# Patient Record
Sex: MALE | Race: WHITE | NOT HISPANIC OR LATINO
[De-identification: names, ages, dates, MRNs, and addresses within clinical notes are randomized per-mention and may not be internally consistent; named-entity substitution may affect disease eponyms.]

---

## 2017-07-27 ENCOUNTER — TRANSCRIPTION ENCOUNTER (OUTPATIENT)
Age: 57
End: 2017-07-27

## 2017-11-02 ENCOUNTER — APPOINTMENT (OUTPATIENT)
Dept: INTERNAL MEDICINE | Facility: CLINIC | Age: 57
End: 2017-11-02

## 2017-12-11 ENCOUNTER — NON-APPOINTMENT (OUTPATIENT)
Age: 57
End: 2017-12-11

## 2017-12-11 ENCOUNTER — APPOINTMENT (OUTPATIENT)
Dept: INTERNAL MEDICINE | Facility: CLINIC | Age: 57
End: 2017-12-11
Payer: COMMERCIAL

## 2017-12-11 VITALS
DIASTOLIC BLOOD PRESSURE: 77 MMHG | HEIGHT: 69 IN | BODY MASS INDEX: 25.62 KG/M2 | RESPIRATION RATE: 18 BRPM | SYSTOLIC BLOOD PRESSURE: 135 MMHG | WEIGHT: 173 LBS | TEMPERATURE: 98.4 F | HEART RATE: 69 BPM | OXYGEN SATURATION: 98 %

## 2017-12-11 DIAGNOSIS — Z92.29 PERSONAL HISTORY OF OTHER DRUG THERAPY: ICD-10-CM

## 2017-12-11 DIAGNOSIS — S83.511A SPRAIN OF ANTERIOR CRUCIATE LIGAMENT OF RIGHT KNEE, INITIAL ENCOUNTER: ICD-10-CM

## 2017-12-11 DIAGNOSIS — Z87.39 PERSONAL HISTORY OF OTHER DISEASES OF THE MUSCULOSKELETAL SYSTEM AND CONNECTIVE TISSUE: ICD-10-CM

## 2017-12-11 LAB
DATE COLLECTED: NORMAL
HEMOCCULT SP1 STL QL: NEGATIVE

## 2017-12-11 PROCEDURE — 82270 OCCULT BLOOD FECES: CPT

## 2017-12-11 PROCEDURE — 99396 PREV VISIT EST AGE 40-64: CPT

## 2017-12-11 PROCEDURE — 93000 ELECTROCARDIOGRAM COMPLETE: CPT

## 2017-12-12 LAB
25(OH)D3 SERPL-MCNC: 24.2 NG/ML
ALBUMIN SERPL ELPH-MCNC: 4.7 G/DL
ALP BLD-CCNC: 52 U/L
ALT SERPL-CCNC: 32 U/L
ANION GAP SERPL CALC-SCNC: 14 MMOL/L
APPEARANCE: CLEAR
AST SERPL-CCNC: 26 U/L
BASOPHILS # BLD AUTO: 0.01 K/UL
BASOPHILS NFR BLD AUTO: 0.2 %
BILIRUB SERPL-MCNC: 0.8 MG/DL
BILIRUBIN URINE: NEGATIVE
BLOOD URINE: NEGATIVE
BUN SERPL-MCNC: 16 MG/DL
CALCIUM SERPL-MCNC: 9.8 MG/DL
CHLORIDE SERPL-SCNC: 101 MMOL/L
CHOLEST SERPL-MCNC: 242 MG/DL
CHOLEST/HDLC SERPL: 4 RATIO
CO2 SERPL-SCNC: 28 MMOL/L
COLOR: YELLOW
CREAT SERPL-MCNC: 1.08 MG/DL
EOSINOPHIL # BLD AUTO: 0.13 K/UL
EOSINOPHIL NFR BLD AUTO: 2.5
GLUCOSE QUALITATIVE U: NEGATIVE MG/DL
GLUCOSE SERPL-MCNC: 97 MG/DL
HCT VFR BLD CALC: 46.2 %
HDLC SERPL-MCNC: 60 MG/DL
HGB BLD-MCNC: 15.5 G/DL
IMM GRANULOCYTES NFR BLD AUTO: 1.2 %
KETONES URINE: NEGATIVE
LDLC SERPL CALC-MCNC: 149 MG/DL
LEUKOCYTE ESTERASE URINE: NEGATIVE
LYMPHOCYTES # BLD AUTO: 1.58 K/UL
LYMPHOCYTES NFR BLD AUTO: 30.6 %
MAN DIFF?: NORMAL
MCHC RBC-ENTMCNC: 30.6 PG
MCHC RBC-ENTMCNC: 33.5 GM/DL
MCV RBC AUTO: 91.3 FL
MONOCYTES # BLD AUTO: 0.47 K/UL
MONOCYTES NFR BLD AUTO: 9.1 %
NEUTROPHILS # BLD AUTO: 2.91 K/UL
NEUTROPHILS NFR BLD AUTO: 56.4 %
NITRITE URINE: NEGATIVE
PH URINE: 6
PLATELET # BLD AUTO: 204 K/UL
POTASSIUM SERPL-SCNC: 4.5 MMOL/L
PROT SERPL-MCNC: 7.7 G/DL
PROTEIN URINE: NEGATIVE MG/DL
PSA SERPL-MCNC: 2.32 NG/ML
RBC # BLD: 5.06 M/UL
RBC # FLD: 13.1 %
SODIUM SERPL-SCNC: 143 MMOL/L
SPECIFIC GRAVITY URINE: 1.02
TRIGL SERPL-MCNC: 163 MG/DL
TSH SERPL-ACNC: 1.92 UIU/ML
UROBILINOGEN URINE: NEGATIVE MG/DL
WBC # FLD AUTO: 5.16 K/UL

## 2018-01-24 ENCOUNTER — APPOINTMENT (OUTPATIENT)
Dept: CARDIOLOGY | Facility: CLINIC | Age: 58
End: 2018-01-24
Payer: COMMERCIAL

## 2018-01-24 VITALS
WEIGHT: 170 LBS | OXYGEN SATURATION: 95 % | DIASTOLIC BLOOD PRESSURE: 80 MMHG | RESPIRATION RATE: 18 BRPM | SYSTOLIC BLOOD PRESSURE: 128 MMHG | HEART RATE: 74 BPM | HEIGHT: 69 IN | BODY MASS INDEX: 25.18 KG/M2

## 2018-01-24 PROCEDURE — 99214 OFFICE O/P EST MOD 30 MIN: CPT

## 2018-03-28 ENCOUNTER — MEDICATION RENEWAL (OUTPATIENT)
Age: 58
End: 2018-03-28

## 2018-07-12 LAB
ALBUMIN SERPL ELPH-MCNC: 4.5 G/DL
ALP BLD-CCNC: 52 U/L
ALT SERPL-CCNC: 33 U/L
ANION GAP SERPL CALC-SCNC: 11 MMOL/L
AST SERPL-CCNC: 31 U/L
BILIRUB SERPL-MCNC: 0.4 MG/DL
BUN SERPL-MCNC: 19 MG/DL
CALCIUM SERPL-MCNC: 9.7 MG/DL
CHLORIDE SERPL-SCNC: 104 MMOL/L
CHOLEST SERPL-MCNC: 157 MG/DL
CHOLEST/HDLC SERPL: 2.7 RATIO
CO2 SERPL-SCNC: 27 MMOL/L
CREAT SERPL-MCNC: 1.12 MG/DL
GLUCOSE SERPL-MCNC: 113 MG/DL
HDLC SERPL-MCNC: 59 MG/DL
LDLC SERPL CALC-MCNC: 79 MG/DL
POTASSIUM SERPL-SCNC: 4.4 MMOL/L
PROT SERPL-MCNC: 7.2 G/DL
SODIUM SERPL-SCNC: 142 MMOL/L
TRIGL SERPL-MCNC: 96 MG/DL

## 2018-07-26 ENCOUNTER — MESSAGE (OUTPATIENT)
Age: 58
End: 2018-07-26

## 2018-11-29 ENCOUNTER — TRANSCRIPTION ENCOUNTER (OUTPATIENT)
Age: 58
End: 2018-11-29

## 2018-12-25 ENCOUNTER — EMERGENCY (EMERGENCY)
Facility: HOSPITAL | Age: 58
LOS: 1 days | Discharge: HOME | End: 2018-12-25
Attending: EMERGENCY MEDICINE | Admitting: EMERGENCY MEDICINE
Payer: COMMERCIAL

## 2018-12-25 VITALS
DIASTOLIC BLOOD PRESSURE: 82 MMHG | SYSTOLIC BLOOD PRESSURE: 150 MMHG | TEMPERATURE: 98 F | OXYGEN SATURATION: 97 % | HEART RATE: 80 BPM | HEIGHT: 69 IN | WEIGHT: 169.98 LBS | RESPIRATION RATE: 16 BRPM

## 2018-12-25 LAB
ALBUMIN SERPL ELPH-MCNC: 3.8 G/DL — SIGNIFICANT CHANGE UP (ref 3.3–5)
ALP SERPL-CCNC: 54 U/L — SIGNIFICANT CHANGE UP (ref 40–120)
ALT FLD-CCNC: 49 U/L — SIGNIFICANT CHANGE UP (ref 12–78)
ANION GAP SERPL CALC-SCNC: 9 MMOL/L — SIGNIFICANT CHANGE UP (ref 5–17)
APPEARANCE UR: ABNORMAL
APTT BLD: 31.5 SEC — SIGNIFICANT CHANGE UP (ref 27.5–36.3)
AST SERPL-CCNC: 36 U/L — SIGNIFICANT CHANGE UP (ref 15–37)
BACTERIA # UR AUTO: ABNORMAL
BASOPHILS # BLD AUTO: 0.03 K/UL — SIGNIFICANT CHANGE UP (ref 0–0.2)
BASOPHILS NFR BLD AUTO: 0.4 % — SIGNIFICANT CHANGE UP (ref 0–2)
BILIRUB SERPL-MCNC: 0.3 MG/DL — SIGNIFICANT CHANGE UP (ref 0.2–1.2)
BILIRUB UR-MCNC: NEGATIVE — SIGNIFICANT CHANGE UP
BUN SERPL-MCNC: 24 MG/DL — HIGH (ref 7–23)
CALCIUM SERPL-MCNC: 9.2 MG/DL — SIGNIFICANT CHANGE UP (ref 8.5–10.1)
CHLORIDE SERPL-SCNC: 107 MMOL/L — SIGNIFICANT CHANGE UP (ref 96–108)
CO2 SERPL-SCNC: 27 MMOL/L — SIGNIFICANT CHANGE UP (ref 22–31)
COLOR SPEC: YELLOW — SIGNIFICANT CHANGE UP
CREAT SERPL-MCNC: 1.2 MG/DL — SIGNIFICANT CHANGE UP (ref 0.5–1.3)
DIFF PNL FLD: ABNORMAL
EOSINOPHIL # BLD AUTO: 0.27 K/UL — SIGNIFICANT CHANGE UP (ref 0–0.5)
EOSINOPHIL NFR BLD AUTO: 3.7 % — SIGNIFICANT CHANGE UP (ref 0–6)
EPI CELLS # UR: ABNORMAL
GLUCOSE SERPL-MCNC: 114 MG/DL — HIGH (ref 70–99)
GLUCOSE UR QL: NEGATIVE — SIGNIFICANT CHANGE UP
HCT VFR BLD CALC: 39.9 % — SIGNIFICANT CHANGE UP (ref 39–50)
HGB BLD-MCNC: 13.9 G/DL — SIGNIFICANT CHANGE UP (ref 13–17)
IMM GRANULOCYTES NFR BLD AUTO: 0.8 % — SIGNIFICANT CHANGE UP (ref 0–1.5)
INR BLD: 1.01 RATIO — SIGNIFICANT CHANGE UP (ref 0.88–1.16)
KETONES UR-MCNC: ABNORMAL
LEUKOCYTE ESTERASE UR-ACNC: ABNORMAL
LYMPHOCYTES # BLD AUTO: 2.88 K/UL — SIGNIFICANT CHANGE UP (ref 1–3.3)
LYMPHOCYTES # BLD AUTO: 39.5 % — SIGNIFICANT CHANGE UP (ref 13–44)
MCHC RBC-ENTMCNC: 30.7 PG — SIGNIFICANT CHANGE UP (ref 27–34)
MCHC RBC-ENTMCNC: 34.8 GM/DL — SIGNIFICANT CHANGE UP (ref 32–36)
MCV RBC AUTO: 88.1 FL — SIGNIFICANT CHANGE UP (ref 80–100)
MONOCYTES # BLD AUTO: 0.78 K/UL — SIGNIFICANT CHANGE UP (ref 0–0.9)
MONOCYTES NFR BLD AUTO: 10.7 % — SIGNIFICANT CHANGE UP (ref 2–14)
NEUTROPHILS # BLD AUTO: 3.28 K/UL — SIGNIFICANT CHANGE UP (ref 1.8–7.4)
NEUTROPHILS NFR BLD AUTO: 44.9 % — SIGNIFICANT CHANGE UP (ref 43–77)
NITRITE UR-MCNC: NEGATIVE — SIGNIFICANT CHANGE UP
NRBC # BLD: 0 /100 WBCS — SIGNIFICANT CHANGE UP (ref 0–0)
PH UR: 5 — SIGNIFICANT CHANGE UP (ref 5–8)
PLATELET # BLD AUTO: 196 K/UL — SIGNIFICANT CHANGE UP (ref 150–400)
POTASSIUM SERPL-MCNC: 3.8 MMOL/L — SIGNIFICANT CHANGE UP (ref 3.5–5.3)
POTASSIUM SERPL-SCNC: 3.8 MMOL/L — SIGNIFICANT CHANGE UP (ref 3.5–5.3)
PROT SERPL-MCNC: 7.3 G/DL — SIGNIFICANT CHANGE UP (ref 6–8.3)
PROT UR-MCNC: 75 MG/DL
PROTHROM AB SERPL-ACNC: 11.4 SEC — SIGNIFICANT CHANGE UP (ref 10–12.9)
RBC # BLD: 4.53 M/UL — SIGNIFICANT CHANGE UP (ref 4.2–5.8)
RBC # FLD: 12.6 % — SIGNIFICANT CHANGE UP (ref 10.3–14.5)
RBC CASTS # UR COMP ASSIST: >50 /HPF (ref 0–4)
SODIUM SERPL-SCNC: 143 MMOL/L — SIGNIFICANT CHANGE UP (ref 135–145)
SP GR SPEC: 1.02 — SIGNIFICANT CHANGE UP (ref 1.01–1.02)
UROBILINOGEN FLD QL: NEGATIVE — SIGNIFICANT CHANGE UP
WBC # BLD: 7.3 K/UL — SIGNIFICANT CHANGE UP (ref 3.8–10.5)
WBC # FLD AUTO: 7.3 K/UL — SIGNIFICANT CHANGE UP (ref 3.8–10.5)
WBC UR QL: SIGNIFICANT CHANGE UP

## 2018-12-25 PROCEDURE — 99284 EMERGENCY DEPT VISIT MOD MDM: CPT | Mod: 25

## 2018-12-25 RX ORDER — IOHEXOL 300 MG/ML
30 INJECTION, SOLUTION INTRAVENOUS ONCE
Qty: 0 | Refills: 0 | Status: COMPLETED | OUTPATIENT
Start: 2018-12-25 | End: 2018-12-25

## 2018-12-25 RX ORDER — SODIUM CHLORIDE 9 MG/ML
3 INJECTION INTRAMUSCULAR; INTRAVENOUS; SUBCUTANEOUS ONCE
Qty: 0 | Refills: 0 | Status: COMPLETED | OUTPATIENT
Start: 2018-12-25 | End: 2018-12-25

## 2018-12-25 RX ORDER — SODIUM CHLORIDE 9 MG/ML
1000 INJECTION INTRAMUSCULAR; INTRAVENOUS; SUBCUTANEOUS ONCE
Qty: 0 | Refills: 0 | Status: COMPLETED | OUTPATIENT
Start: 2018-12-25 | End: 2018-12-25

## 2018-12-25 RX ADMIN — IOHEXOL 30 MILLILITER(S): 300 INJECTION, SOLUTION INTRAVENOUS at 23:42

## 2018-12-25 RX ADMIN — SODIUM CHLORIDE 1000 MILLILITER(S): 9 INJECTION INTRAMUSCULAR; INTRAVENOUS; SUBCUTANEOUS at 23:42

## 2018-12-25 RX ADMIN — SODIUM CHLORIDE 3 MILLILITER(S): 9 INJECTION INTRAMUSCULAR; INTRAVENOUS; SUBCUTANEOUS at 23:36

## 2018-12-25 NOTE — ED ADULT NURSE NOTE - CHPI ED NUR SYMPTOMS NEG
no burning urination/no hematuria/no chills/no diarrhea/no vomiting/no nausea/no abdominal distension/no fever/no blood in stool/no dysuria

## 2018-12-25 NOTE — ED PROVIDER NOTE - OBJECTIVE STATEMENT
pt c/o abd pain today. pt relates began as generalized vague pain, worsened and localized to rlq. no fevers, chills, ha, d/n/v, diarrhea, constipation, dysuria, hematuria, frequency. pt declining pain meds.

## 2018-12-25 NOTE — ED ADULT TRIAGE NOTE - CHIEF COMPLAINT QUOTE
diffuse abdominal cramping throughout the day, tonight right lower quadrant pain worsened. denies nausea/vomiting

## 2018-12-25 NOTE — ED PROVIDER NOTE - CARE PLAN
Principal Discharge DX:	RLQ abdominal pain Principal Discharge DX:	Renal stone  Secondary Diagnosis:	UTI (urinary tract infection)

## 2018-12-25 NOTE — ED PROVIDER NOTE - RELIEVING FACTORS
Problem: Patient Care Overview (Adult)  Goal: Plan of Care Review  Outcome: Ongoing (interventions implemented as appropriate)    04/09/17 0343   Coping/Psychosocial Response Interventions   Plan Of Care Reviewed With patient   Patient Care Overview   Progress improving   Outcome Evaluation   Outcome Summary/Follow up Plan No c/o of pain or discomfort voiced. Pt heart rate sustained above 120, PRN Lopressor given, HR did drop below 120. Pt is on telemetry. IV fluid of NS going at rate of 100 ml/hr. Pt up with assist of 1 and walker. Will continue to monitor.        Goal: Adult Individualization and Mutuality  Outcome: Ongoing (interventions implemented as appropriate)  Goal: Discharge Needs Assessment  Outcome: Ongoing (interventions implemented as appropriate)    Problem: Fall Risk (Adult)  Goal: Identify Related Risk Factors and Signs and Symptoms  Outcome: Ongoing (interventions implemented as appropriate)  Goal: Absence of Falls  Outcome: Ongoing (interventions implemented as appropriate)    Problem: Skin Integrity Impairment, Risk/Actual (Adult)  Goal: Identify Related Risk Factors and Signs and Symptoms  Outcome: Ongoing (interventions implemented as appropriate)  Goal: Skin Integrity/Wound Healing  Outcome: Ongoing (interventions implemented as appropriate)       none

## 2018-12-25 NOTE — ED ADULT NURSE NOTE - OBJECTIVE STATEMENT
Pt comes from triage. Pt reports that he has had abdominal cramping all day today, localized to right lower quadrant tonight. Pt denies nausea/vomiting. Pt breathing easy, unlabored, no signs of distress.

## 2018-12-26 VITALS
DIASTOLIC BLOOD PRESSURE: 79 MMHG | TEMPERATURE: 98 F | SYSTOLIC BLOOD PRESSURE: 150 MMHG | RESPIRATION RATE: 16 BRPM | HEART RATE: 96 BPM | OXYGEN SATURATION: 96 %

## 2018-12-26 LAB — LIDOCAIN IGE QN: 206 U/L — SIGNIFICANT CHANGE UP (ref 73–393)

## 2018-12-26 PROCEDURE — 81001 URINALYSIS AUTO W/SCOPE: CPT

## 2018-12-26 PROCEDURE — 74177 CT ABD & PELVIS W/CONTRAST: CPT | Mod: 26

## 2018-12-26 PROCEDURE — 85027 COMPLETE CBC AUTOMATED: CPT

## 2018-12-26 PROCEDURE — 96374 THER/PROPH/DIAG INJ IV PUSH: CPT | Mod: XU

## 2018-12-26 PROCEDURE — 83690 ASSAY OF LIPASE: CPT

## 2018-12-26 PROCEDURE — 96376 TX/PRO/DX INJ SAME DRUG ADON: CPT

## 2018-12-26 PROCEDURE — 36415 COLL VENOUS BLD VENIPUNCTURE: CPT

## 2018-12-26 PROCEDURE — 87086 URINE CULTURE/COLONY COUNT: CPT

## 2018-12-26 PROCEDURE — 96375 TX/PRO/DX INJ NEW DRUG ADDON: CPT

## 2018-12-26 PROCEDURE — 85610 PROTHROMBIN TIME: CPT

## 2018-12-26 PROCEDURE — 99284 EMERGENCY DEPT VISIT MOD MDM: CPT | Mod: 25

## 2018-12-26 PROCEDURE — 74177 CT ABD & PELVIS W/CONTRAST: CPT

## 2018-12-26 PROCEDURE — 80053 COMPREHEN METABOLIC PANEL: CPT

## 2018-12-26 PROCEDURE — 85730 THROMBOPLASTIN TIME PARTIAL: CPT

## 2018-12-26 RX ORDER — ONDANSETRON 8 MG/1
4 TABLET, FILM COATED ORAL ONCE
Qty: 0 | Refills: 0 | Status: COMPLETED | OUTPATIENT
Start: 2018-12-26 | End: 2018-12-26

## 2018-12-26 RX ORDER — NITROFURANTOIN MACROCRYSTAL 50 MG
100 CAPSULE ORAL ONCE
Qty: 0 | Refills: 0 | Status: COMPLETED | OUTPATIENT
Start: 2018-12-26 | End: 2018-12-26

## 2018-12-26 RX ORDER — HYDROMORPHONE HYDROCHLORIDE 2 MG/ML
0.5 INJECTION INTRAMUSCULAR; INTRAVENOUS; SUBCUTANEOUS ONCE
Qty: 0 | Refills: 0 | Status: DISCONTINUED | OUTPATIENT
Start: 2018-12-26 | End: 2018-12-26

## 2018-12-26 RX ORDER — TAMSULOSIN HYDROCHLORIDE 0.4 MG/1
1 CAPSULE ORAL
Qty: 14 | Refills: 0
Start: 2018-12-26 | End: 2019-01-24

## 2018-12-26 RX ORDER — NITROFURANTOIN MACROCRYSTAL 50 MG
1 CAPSULE ORAL
Qty: 14 | Refills: 0
Start: 2018-12-26 | End: 2019-01-01

## 2018-12-26 RX ORDER — MORPHINE SULFATE 50 MG/1
4 CAPSULE, EXTENDED RELEASE ORAL ONCE
Qty: 0 | Refills: 0 | Status: DISCONTINUED | OUTPATIENT
Start: 2018-12-26 | End: 2018-12-26

## 2018-12-26 RX ORDER — TAMSULOSIN HYDROCHLORIDE 0.4 MG/1
0.4 CAPSULE ORAL AT BEDTIME
Qty: 0 | Refills: 0 | Status: DISCONTINUED | OUTPATIENT
Start: 2018-12-26 | End: 2018-12-29

## 2018-12-26 RX ORDER — OXYCODONE AND ACETAMINOPHEN 5; 325 MG/1; MG/1
1 TABLET ORAL ONCE
Qty: 0 | Refills: 0 | Status: DISCONTINUED | OUTPATIENT
Start: 2018-12-26 | End: 2018-12-26

## 2018-12-26 RX ORDER — SODIUM CHLORIDE 9 MG/ML
1000 INJECTION INTRAMUSCULAR; INTRAVENOUS; SUBCUTANEOUS ONCE
Qty: 0 | Refills: 0 | Status: COMPLETED | OUTPATIENT
Start: 2018-12-26 | End: 2018-12-26

## 2018-12-26 RX ADMIN — Medication 100 MILLIGRAM(S): at 03:12

## 2018-12-26 RX ADMIN — HYDROMORPHONE HYDROCHLORIDE 0.5 MILLIGRAM(S): 2 INJECTION INTRAMUSCULAR; INTRAVENOUS; SUBCUTANEOUS at 05:20

## 2018-12-26 RX ADMIN — HYDROMORPHONE HYDROCHLORIDE 0.5 MILLIGRAM(S): 2 INJECTION INTRAMUSCULAR; INTRAVENOUS; SUBCUTANEOUS at 06:03

## 2018-12-26 RX ADMIN — OXYCODONE AND ACETAMINOPHEN 1 TABLET(S): 5; 325 TABLET ORAL at 05:05

## 2018-12-26 RX ADMIN — ONDANSETRON 4 MILLIGRAM(S): 8 TABLET, FILM COATED ORAL at 05:20

## 2018-12-26 RX ADMIN — ONDANSETRON 4 MILLIGRAM(S): 8 TABLET, FILM COATED ORAL at 00:47

## 2018-12-26 RX ADMIN — SODIUM CHLORIDE 1000 MILLILITER(S): 9 INJECTION INTRAMUSCULAR; INTRAVENOUS; SUBCUTANEOUS at 05:20

## 2018-12-26 RX ADMIN — MORPHINE SULFATE 4 MILLIGRAM(S): 50 CAPSULE, EXTENDED RELEASE ORAL at 01:06

## 2018-12-26 RX ADMIN — OXYCODONE AND ACETAMINOPHEN 1 TABLET(S): 5; 325 TABLET ORAL at 03:12

## 2018-12-26 RX ADMIN — MORPHINE SULFATE 4 MILLIGRAM(S): 50 CAPSULE, EXTENDED RELEASE ORAL at 00:47

## 2018-12-26 RX ADMIN — TAMSULOSIN HYDROCHLORIDE 0.4 MILLIGRAM(S): 0.4 CAPSULE ORAL at 03:50

## 2018-12-26 RX ADMIN — SODIUM CHLORIDE 1000 MILLILITER(S): 9 INJECTION INTRAMUSCULAR; INTRAVENOUS; SUBCUTANEOUS at 01:06

## 2018-12-26 NOTE — ED ADULT NURSE REASSESSMENT NOTE - NS ED NURSE REASSESS COMMENT FT1
Pt had continued pain and spasms after pt was deemed safe for discharge by MD Rogers. Pt re-evaluated, attempted percocet and flomax with little relief. IV reinserted and medicated per orders. Will continue to monitor. Detail Level: Zone Initiate Treatment: Ketoconazole 2% shampoo 2 times a week.

## 2018-12-27 LAB
CULTURE RESULTS: NO GROWTH — SIGNIFICANT CHANGE UP
SPECIMEN SOURCE: SIGNIFICANT CHANGE UP

## 2019-01-25 ENCOUNTER — NON-APPOINTMENT (OUTPATIENT)
Age: 59
End: 2019-01-25

## 2019-01-25 ENCOUNTER — APPOINTMENT (OUTPATIENT)
Dept: INTERNAL MEDICINE | Facility: CLINIC | Age: 59
End: 2019-01-25
Payer: COMMERCIAL

## 2019-01-25 VITALS
HEART RATE: 66 BPM | OXYGEN SATURATION: 96 % | RESPIRATION RATE: 18 BRPM | HEIGHT: 69 IN | SYSTOLIC BLOOD PRESSURE: 124 MMHG | DIASTOLIC BLOOD PRESSURE: 83 MMHG | BODY MASS INDEX: 25.18 KG/M2 | WEIGHT: 170 LBS | TEMPERATURE: 98.5 F

## 2019-01-25 DIAGNOSIS — K52.9 NONINFECTIVE GASTROENTERITIS AND COLITIS, UNSPECIFIED: ICD-10-CM

## 2019-01-25 LAB
25(OH)D3 SERPL-MCNC: 31.1 NG/ML
ALBUMIN SERPL ELPH-MCNC: 4.5 G/DL
ALP BLD-CCNC: 51 U/L
ALT SERPL-CCNC: 30 U/L
ANION GAP SERPL CALC-SCNC: 12 MMOL/L
APPEARANCE: CLEAR
AST SERPL-CCNC: 23 U/L
BASOPHILS # BLD AUTO: 0.03 K/UL
BASOPHILS NFR BLD AUTO: 0.6 %
BILIRUB SERPL-MCNC: 0.6 MG/DL
BILIRUBIN URINE: NEGATIVE
BLOOD URINE: NEGATIVE
BUN SERPL-MCNC: 17 MG/DL
CALCIUM SERPL-MCNC: 9.9 MG/DL
CHLORIDE SERPL-SCNC: 104 MMOL/L
CHOLEST SERPL-MCNC: 162 MG/DL
CHOLEST/HDLC SERPL: 3.1 RATIO
CO2 SERPL-SCNC: 26 MMOL/L
COLOR: YELLOW
CREAT SERPL-MCNC: 1.09 MG/DL
EOSINOPHIL # BLD AUTO: 0.1 K/UL
EOSINOPHIL NFR BLD AUTO: 2 %
GLUCOSE QUALITATIVE U: NEGATIVE MG/DL
GLUCOSE SERPL-MCNC: 88 MG/DL
HCT VFR BLD CALC: 43.9 %
HDLC SERPL-MCNC: 53 MG/DL
HGB BLD-MCNC: 15 G/DL
IMM GRANULOCYTES NFR BLD AUTO: 0.6 %
KETONES URINE: NEGATIVE
LDLC SERPL CALC-MCNC: 92 MG/DL
LEUKOCYTE ESTERASE URINE: NEGATIVE
LYMPHOCYTES # BLD AUTO: 1.62 K/UL
LYMPHOCYTES NFR BLD AUTO: 32.1 %
MAN DIFF?: NORMAL
MCHC RBC-ENTMCNC: 30.7 PG
MCHC RBC-ENTMCNC: 34.2 GM/DL
MCV RBC AUTO: 89.8 FL
MONOCYTES # BLD AUTO: 0.54 K/UL
MONOCYTES NFR BLD AUTO: 10.7 %
NEUTROPHILS # BLD AUTO: 2.72 K/UL
NEUTROPHILS NFR BLD AUTO: 54 %
NITRITE URINE: NEGATIVE
PH URINE: 6
PLATELET # BLD AUTO: 179 K/UL
POTASSIUM SERPL-SCNC: 4.3 MMOL/L
PROT SERPL-MCNC: 7.2 G/DL
PROTEIN URINE: NEGATIVE MG/DL
PSA SERPL-MCNC: 1.81 NG/ML
RBC # BLD: 4.89 M/UL
RBC # FLD: 12.8 %
SODIUM SERPL-SCNC: 142 MMOL/L
SPECIFIC GRAVITY URINE: 1.02
TRIGL SERPL-MCNC: 83 MG/DL
TSH SERPL-ACNC: 1.61 UIU/ML
UROBILINOGEN URINE: NEGATIVE MG/DL
WBC # FLD AUTO: 5.04 K/UL

## 2019-01-25 PROCEDURE — 99396 PREV VISIT EST AGE 40-64: CPT | Mod: 25

## 2019-01-25 PROCEDURE — 93000 ELECTROCARDIOGRAM COMPLETE: CPT

## 2019-01-25 RX ORDER — ESOMEPRAZOLE MAGNESIUM 20 MG/1
20 CAPSULE, DELAYED RELEASE ORAL
Qty: 1 | Refills: 2 | Status: DISCONTINUED | COMMUNITY
Start: 2017-12-11 | End: 2019-01-25

## 2019-03-26 ENCOUNTER — RESULT REVIEW (OUTPATIENT)
Age: 59
End: 2019-03-26

## 2019-12-06 ENCOUNTER — MEDICATION RENEWAL (OUTPATIENT)
Age: 59
End: 2019-12-06

## 2019-12-30 ENCOUNTER — MEDICATION RENEWAL (OUTPATIENT)
Age: 59
End: 2019-12-30

## 2020-01-06 ENCOUNTER — MEDICATION RENEWAL (OUTPATIENT)
Age: 60
End: 2020-01-06

## 2020-01-27 ENCOUNTER — APPOINTMENT (OUTPATIENT)
Dept: INTERNAL MEDICINE | Facility: CLINIC | Age: 60
End: 2020-01-27
Payer: COMMERCIAL

## 2020-01-27 ENCOUNTER — NON-APPOINTMENT (OUTPATIENT)
Age: 60
End: 2020-01-27

## 2020-01-27 VITALS
HEIGHT: 69 IN | HEART RATE: 67 BPM | OXYGEN SATURATION: 97 % | WEIGHT: 169.56 LBS | DIASTOLIC BLOOD PRESSURE: 79 MMHG | SYSTOLIC BLOOD PRESSURE: 130 MMHG | BODY MASS INDEX: 25.11 KG/M2

## 2020-01-27 DIAGNOSIS — L30.9 DERMATITIS, UNSPECIFIED: ICD-10-CM

## 2020-01-27 LAB
25(OH)D3 SERPL-MCNC: 34.2 NG/ML
ALBUMIN SERPL ELPH-MCNC: 4.7 G/DL
ALP BLD-CCNC: 57 U/L
ALT SERPL-CCNC: 33 U/L
ANION GAP SERPL CALC-SCNC: 13 MMOL/L
APPEARANCE: CLEAR
AST SERPL-CCNC: 24 U/L
BASOPHILS # BLD AUTO: 0.04 K/UL
BASOPHILS NFR BLD AUTO: 0.8 %
BILIRUB SERPL-MCNC: 0.8 MG/DL
BILIRUBIN URINE: NEGATIVE
BLOOD URINE: NEGATIVE
BUN SERPL-MCNC: 17 MG/DL
CALCIUM SERPL-MCNC: 9.7 MG/DL
CHLORIDE SERPL-SCNC: 102 MMOL/L
CHOLEST SERPL-MCNC: 179 MG/DL
CHOLEST/HDLC SERPL: 3.4 RATIO
CO2 SERPL-SCNC: 26 MMOL/L
COLOR: NORMAL
CREAT SERPL-MCNC: 1.1 MG/DL
DATE COLLECTED: NORMAL
EOSINOPHIL # BLD AUTO: 0.09 K/UL
EOSINOPHIL NFR BLD AUTO: 1.7 %
GLUCOSE QUALITATIVE U: NEGATIVE
GLUCOSE SERPL-MCNC: 99 MG/DL
HCT VFR BLD CALC: 44.8 %
HDLC SERPL-MCNC: 53 MG/DL
HEMOCCULT SP1 STL QL: NEGATIVE
HGB BLD-MCNC: 14.8 G/DL
IMM GRANULOCYTES NFR BLD AUTO: 1.2 %
KETONES URINE: NEGATIVE
LDLC SERPL CALC-MCNC: 104 MG/DL
LEUKOCYTE ESTERASE URINE: NEGATIVE
LYMPHOCYTES # BLD AUTO: 1.6 K/UL
LYMPHOCYTES NFR BLD AUTO: 30.9 %
MAN DIFF?: NORMAL
MCHC RBC-ENTMCNC: 30 PG
MCHC RBC-ENTMCNC: 33 GM/DL
MCV RBC AUTO: 90.9 FL
MONOCYTES # BLD AUTO: 0.68 K/UL
MONOCYTES NFR BLD AUTO: 13.2 %
NEUTROPHILS # BLD AUTO: 2.7 K/UL
NEUTROPHILS NFR BLD AUTO: 52.2 %
NITRITE URINE: NEGATIVE
PH URINE: 5.5
PLATELET # BLD AUTO: 205 K/UL
POTASSIUM SERPL-SCNC: 4.6 MMOL/L
PROT SERPL-MCNC: 7.2 G/DL
PROTEIN URINE: NEGATIVE
PSA SERPL-MCNC: 3.85 NG/ML
RBC # BLD: 4.93 M/UL
RBC # FLD: 12.4 %
SODIUM SERPL-SCNC: 141 MMOL/L
SPECIFIC GRAVITY URINE: 1.02
TRIGL SERPL-MCNC: 110 MG/DL
TSH SERPL-ACNC: 1.79 UIU/ML
UROBILINOGEN URINE: NORMAL
WBC # FLD AUTO: 5.17 K/UL

## 2020-01-27 PROCEDURE — 82270 OCCULT BLOOD FECES: CPT

## 2020-01-27 PROCEDURE — 99396 PREV VISIT EST AGE 40-64: CPT | Mod: 25

## 2020-01-27 PROCEDURE — 36415 COLL VENOUS BLD VENIPUNCTURE: CPT

## 2020-01-27 PROCEDURE — 93000 ELECTROCARDIOGRAM COMPLETE: CPT

## 2020-01-27 RX ORDER — FLUTICASONE PROPIONATE 50 UG/1
50 SPRAY, METERED NASAL DAILY
Qty: 1 | Refills: 0 | Status: DISCONTINUED | COMMUNITY
Start: 2019-01-25 | End: 2020-01-27

## 2020-02-17 ENCOUNTER — TRANSCRIPTION ENCOUNTER (OUTPATIENT)
Age: 60
End: 2020-02-17

## 2020-07-29 ENCOUNTER — NON-APPOINTMENT (OUTPATIENT)
Age: 60
End: 2020-07-29

## 2020-07-29 ENCOUNTER — APPOINTMENT (OUTPATIENT)
Dept: CARDIOLOGY | Facility: CLINIC | Age: 60
End: 2020-07-29
Payer: COMMERCIAL

## 2020-07-29 VITALS
BODY MASS INDEX: 24.88 KG/M2 | DIASTOLIC BLOOD PRESSURE: 79 MMHG | OXYGEN SATURATION: 96 % | WEIGHT: 168 LBS | HEART RATE: 71 BPM | SYSTOLIC BLOOD PRESSURE: 125 MMHG | HEIGHT: 69 IN

## 2020-07-29 PROCEDURE — 93000 ELECTROCARDIOGRAM COMPLETE: CPT

## 2020-07-29 PROCEDURE — 99213 OFFICE O/P EST LOW 20 MIN: CPT

## 2020-08-06 NOTE — HISTORY OF PRESENT ILLNESS
[FreeTextEntry1] : Doing well overall.\par Still with intermittent chest pains - no clear relation with exertion but does come and go.\par No syncope\par No Le edema\par No palps\par \par

## 2020-08-06 NOTE — REASON FOR VISIT
[Follow-Up - Clinic] : a clinic follow-up of [Hyperlipidemia] : hyperlipidemia [Hypertension] : hypertension [Medication Management] : Medication management [FreeTextEntry1] : Cardiac risk assessment

## 2020-08-06 NOTE — PHYSICAL EXAM
[General Appearance - Well Developed] : well developed [Normal Appearance] : normal appearance [General Appearance - Well Nourished] : well nourished [Well Groomed] : well groomed [General Appearance - In No Acute Distress] : no acute distress [No Deformities] : no deformities [Normal Conjunctiva] : the conjunctiva exhibited no abnormalities [Eyelids - No Xanthelasma] : the eyelids demonstrated no xanthelasmas [Normal Oral Mucosa] : normal oral mucosa [Normal Jugular Venous A Waves Present] : normal jugular venous A waves present [No Oral Pallor] : no oral pallor [No Oral Cyanosis] : no oral cyanosis [Respiration, Rhythm And Depth] : normal respiratory rhythm and effort [No Jugular Venous Delgado A Waves] : no jugular venous delgado A waves [Normal Jugular Venous V Waves Present] : normal jugular venous V waves present [Heart Rate And Rhythm] : heart rate and rhythm were normal [Auscultation Breath Sounds / Voice Sounds] : lungs were clear to auscultation bilaterally [Exaggerated Use Of Accessory Muscles For Inspiration] : no accessory muscle use [Heart Sounds] : normal S1 and S2 [Abdomen Soft] : soft [Murmurs] : no murmurs present [Abdomen Mass (___ Cm)] : no abdominal mass palpated [Abdomen Tenderness] : non-tender [Nail Clubbing] : no clubbing of the fingernails [Gait - Sufficient For Exercise Testing] : the gait was sufficient for exercise testing [Abnormal Walk] : normal gait [Petechial Hemorrhages (___cm)] : no petechial hemorrhages [Cyanosis, Localized] : no localized cyanosis [] : no rash [Skin Color & Pigmentation] : normal skin color and pigmentation [No Venous Stasis] : no venous stasis [Skin Lesions] : no skin lesions [No Skin Ulcers] : no skin ulcer [No Xanthoma] : no  xanthoma was observed [Oriented To Time, Place, And Person] : oriented to person, place, and time [Mood] : the mood was normal [Affect] : the affect was normal [No Anxiety] : not feeling anxious

## 2020-08-06 NOTE — DISCUSSION/SUMMARY
[FreeTextEntry1] : Intermittent CP \par Check labs\par Stress test to assess BP, and rhythm with exercise, and to also assess symptoms with exercise ojectively.\par \par \par

## 2020-08-11 ENCOUNTER — RESULT REVIEW (OUTPATIENT)
Age: 60
End: 2020-08-11

## 2020-08-18 LAB
ALBUMIN SERPL ELPH-MCNC: 4.8 G/DL
ALP BLD-CCNC: 59 U/L
ALT SERPL-CCNC: 54 U/L
ANION GAP SERPL CALC-SCNC: 15 MMOL/L
AST SERPL-CCNC: 41 U/L
BILIRUB SERPL-MCNC: 0.6 MG/DL
BUN SERPL-MCNC: 17 MG/DL
CALCIUM SERPL-MCNC: 9.9 MG/DL
CHLORIDE SERPL-SCNC: 102 MMOL/L
CHOLEST SERPL-MCNC: 178 MG/DL
CHOLEST/HDLC SERPL: 3.7 RATIO
CO2 SERPL-SCNC: 24 MMOL/L
CREAT SERPL-MCNC: 1.23 MG/DL
GLUCOSE SERPL-MCNC: 108 MG/DL
HDLC SERPL-MCNC: 49 MG/DL
LDLC SERPL CALC-MCNC: 104 MG/DL
POTASSIUM SERPL-SCNC: 4.8 MMOL/L
PROT SERPL-MCNC: 7 G/DL
SODIUM SERPL-SCNC: 141 MMOL/L
TRIGL SERPL-MCNC: 128 MG/DL

## 2021-02-04 ENCOUNTER — APPOINTMENT (OUTPATIENT)
Dept: INTERNAL MEDICINE | Facility: CLINIC | Age: 61
End: 2021-02-04
Payer: COMMERCIAL

## 2021-02-04 VITALS
DIASTOLIC BLOOD PRESSURE: 81 MMHG | SYSTOLIC BLOOD PRESSURE: 126 MMHG | HEART RATE: 70 BPM | OXYGEN SATURATION: 98 % | HEIGHT: 69 IN | WEIGHT: 166 LBS | BODY MASS INDEX: 24.59 KG/M2 | TEMPERATURE: 98 F | RESPIRATION RATE: 14 BRPM

## 2021-02-04 DIAGNOSIS — Z87.898 PERSONAL HISTORY OF OTHER SPECIFIED CONDITIONS: ICD-10-CM

## 2021-02-04 DIAGNOSIS — Z12.11 ENCOUNTER FOR SCREENING FOR MALIGNANT NEOPLASM OF COLON: ICD-10-CM

## 2021-02-04 PROCEDURE — 36415 COLL VENOUS BLD VENIPUNCTURE: CPT

## 2021-02-04 PROCEDURE — 99396 PREV VISIT EST AGE 40-64: CPT | Mod: 25

## 2021-02-04 PROCEDURE — 99072 ADDL SUPL MATRL&STAF TM PHE: CPT

## 2021-02-04 RX ORDER — PSYLLIUM HUSK 0.4 G
0.52 CAPSULE ORAL
Refills: 0 | Status: DISCONTINUED | COMMUNITY
End: 2021-02-04

## 2021-02-05 ENCOUNTER — TRANSCRIPTION ENCOUNTER (OUTPATIENT)
Age: 61
End: 2021-02-05

## 2021-02-05 LAB
25(OH)D3 SERPL-MCNC: 36.2 NG/ML
ALBUMIN SERPL ELPH-MCNC: 4.3 G/DL
ALP BLD-CCNC: 54 U/L
ALT SERPL-CCNC: 24 U/L
ANION GAP SERPL CALC-SCNC: 12 MMOL/L
APPEARANCE: CLEAR
AST SERPL-CCNC: 22 U/L
BASOPHILS # BLD AUTO: 0.04 K/UL
BASOPHILS NFR BLD AUTO: 0.7 %
BILIRUB SERPL-MCNC: 0.8 MG/DL
BILIRUBIN URINE: NEGATIVE
BLOOD URINE: NEGATIVE
BUN SERPL-MCNC: 15 MG/DL
CALCIUM SERPL-MCNC: 9.6 MG/DL
CHLORIDE SERPL-SCNC: 107 MMOL/L
CHOLEST SERPL-MCNC: 166 MG/DL
CO2 SERPL-SCNC: 24 MMOL/L
COLOR: NORMAL
CREAT SERPL-MCNC: 1.1 MG/DL
EOSINOPHIL # BLD AUTO: 0.1 K/UL
EOSINOPHIL NFR BLD AUTO: 1.9 %
GLUCOSE QUALITATIVE U: NEGATIVE
GLUCOSE SERPL-MCNC: 97 MG/DL
HCT VFR BLD CALC: 43.5 %
HDLC SERPL-MCNC: 52 MG/DL
HGB BLD-MCNC: 14.4 G/DL
IMM GRANULOCYTES NFR BLD AUTO: 0.9 %
KETONES URINE: NEGATIVE
LDLC SERPL CALC-MCNC: 98 MG/DL
LEUKOCYTE ESTERASE URINE: NEGATIVE
LYMPHOCYTES # BLD AUTO: 1.63 K/UL
LYMPHOCYTES NFR BLD AUTO: 30.4 %
MAN DIFF?: NORMAL
MCHC RBC-ENTMCNC: 30.5 PG
MCHC RBC-ENTMCNC: 33.1 GM/DL
MCV RBC AUTO: 92.2 FL
MONOCYTES # BLD AUTO: 0.67 K/UL
MONOCYTES NFR BLD AUTO: 12.5 %
NEUTROPHILS # BLD AUTO: 2.87 K/UL
NEUTROPHILS NFR BLD AUTO: 53.6 %
NITRITE URINE: NEGATIVE
NONHDLC SERPL-MCNC: 115 MG/DL
PH URINE: 5
PLATELET # BLD AUTO: 202 K/UL
POTASSIUM SERPL-SCNC: 4.3 MMOL/L
PROT SERPL-MCNC: 6.8 G/DL
PROTEIN URINE: NEGATIVE
PSA SERPL-MCNC: 4.82 NG/ML
RBC # BLD: 4.72 M/UL
RBC # FLD: 12.5 %
SODIUM SERPL-SCNC: 142 MMOL/L
SPECIFIC GRAVITY URINE: 1.02
TRIGL SERPL-MCNC: 84 MG/DL
TSH SERPL-ACNC: 1.46 UIU/ML
UROBILINOGEN URINE: NORMAL
VZV AB TITR SER: POSITIVE
VZV IGG SER IF-ACNC: >4000 INDEX
WBC # FLD AUTO: 5.36 K/UL

## 2021-04-12 ENCOUNTER — APPOINTMENT (OUTPATIENT)
Dept: DISASTER EMERGENCY | Facility: OTHER | Age: 61
End: 2021-04-12
Payer: COMMERCIAL

## 2021-04-12 PROCEDURE — 0012A: CPT

## 2021-09-01 ENCOUNTER — APPOINTMENT (OUTPATIENT)
Dept: CT IMAGING | Facility: CLINIC | Age: 61
End: 2021-09-01
Payer: COMMERCIAL

## 2021-09-01 ENCOUNTER — OUTPATIENT (OUTPATIENT)
Dept: OUTPATIENT SERVICES | Facility: HOSPITAL | Age: 61
LOS: 1 days | End: 2021-09-01
Payer: COMMERCIAL

## 2021-09-01 DIAGNOSIS — Z00.8 ENCOUNTER FOR OTHER GENERAL EXAMINATION: ICD-10-CM

## 2021-09-01 PROCEDURE — 74178 CT ABD&PLV WO CNTR FLWD CNTR: CPT

## 2021-09-01 PROCEDURE — 82565 ASSAY OF CREATININE: CPT

## 2021-09-01 PROCEDURE — 74178 CT ABD&PLV WO CNTR FLWD CNTR: CPT | Mod: 26

## 2021-09-30 ENCOUNTER — OUTPATIENT (OUTPATIENT)
Dept: OUTPATIENT SERVICES | Facility: HOSPITAL | Age: 61
LOS: 1 days | End: 2021-09-30
Payer: COMMERCIAL

## 2021-09-30 ENCOUNTER — APPOINTMENT (OUTPATIENT)
Dept: CV DIAGNOSTICS | Facility: HOSPITAL | Age: 61
End: 2021-09-30

## 2021-09-30 DIAGNOSIS — E78.5 HYPERLIPIDEMIA, UNSPECIFIED: ICD-10-CM

## 2021-09-30 PROCEDURE — 93018 CV STRESS TEST I&R ONLY: CPT

## 2021-09-30 PROCEDURE — 93016 CV STRESS TEST SUPVJ ONLY: CPT

## 2021-09-30 PROCEDURE — 93017 CV STRESS TEST TRACING ONLY: CPT

## 2021-10-01 DIAGNOSIS — R94.39 ABNORMAL RESULT OF OTHER CARDIOVASCULAR FUNCTION STUDY: ICD-10-CM

## 2021-10-04 LAB
ANION GAP SERPL CALC-SCNC: 12 MMOL/L
BUN SERPL-MCNC: 13 MG/DL
CALCIUM SERPL-MCNC: 9.9 MG/DL
CHLORIDE SERPL-SCNC: 103 MMOL/L
CO2 SERPL-SCNC: 27 MMOL/L
CREAT SERPL-MCNC: 1.04 MG/DL
GLUCOSE SERPL-MCNC: 116 MG/DL
POTASSIUM SERPL-SCNC: 4.7 MMOL/L
SODIUM SERPL-SCNC: 143 MMOL/L

## 2021-10-05 ENCOUNTER — NON-APPOINTMENT (OUTPATIENT)
Age: 61
End: 2021-10-05

## 2021-10-07 ENCOUNTER — OUTPATIENT (OUTPATIENT)
Dept: OUTPATIENT SERVICES | Facility: HOSPITAL | Age: 61
LOS: 1 days | End: 2021-10-07
Payer: COMMERCIAL

## 2021-10-07 ENCOUNTER — APPOINTMENT (OUTPATIENT)
Dept: CARDIOLOGY | Facility: CLINIC | Age: 61
End: 2021-10-07

## 2021-10-07 DIAGNOSIS — R94.39 ABNORMAL RESULT OF OTHER CARDIOVASCULAR FUNCTION STUDY: ICD-10-CM

## 2021-10-07 DIAGNOSIS — R07.9 CHEST PAIN, UNSPECIFIED: ICD-10-CM

## 2021-10-07 PROCEDURE — 75574 CT ANGIO HRT W/3D IMAGE: CPT

## 2021-10-07 PROCEDURE — 75574 CT ANGIO HRT W/3D IMAGE: CPT | Mod: 26

## 2021-10-11 LAB
CHOLEST SERPL-MCNC: 166 MG/DL
HDLC SERPL-MCNC: 51 MG/DL
LDLC SERPL CALC-MCNC: 90 MG/DL
NONHDLC SERPL-MCNC: 115 MG/DL
TRIGL SERPL-MCNC: 127 MG/DL

## 2021-10-15 ENCOUNTER — TRANSCRIPTION ENCOUNTER (OUTPATIENT)
Age: 61
End: 2021-10-15

## 2021-10-25 ENCOUNTER — OUTPATIENT (OUTPATIENT)
Dept: OUTPATIENT SERVICES | Facility: HOSPITAL | Age: 61
LOS: 1 days | End: 2021-10-25
Payer: COMMERCIAL

## 2021-10-25 VITALS
SYSTOLIC BLOOD PRESSURE: 135 MMHG | TEMPERATURE: 98 F | HEART RATE: 82 BPM | OXYGEN SATURATION: 97 % | HEIGHT: 69 IN | WEIGHT: 169.98 LBS | DIASTOLIC BLOOD PRESSURE: 87 MMHG | RESPIRATION RATE: 18 BRPM

## 2021-10-25 DIAGNOSIS — N20.1 CALCULUS OF URETER: ICD-10-CM

## 2021-10-25 DIAGNOSIS — Z01.818 ENCOUNTER FOR OTHER PREPROCEDURAL EXAMINATION: ICD-10-CM

## 2021-10-25 DIAGNOSIS — Z98.890 OTHER SPECIFIED POSTPROCEDURAL STATES: Chronic | ICD-10-CM

## 2021-10-25 LAB
ALBUMIN SERPL ELPH-MCNC: 3.8 G/DL — SIGNIFICANT CHANGE UP (ref 3.3–5)
ALP SERPL-CCNC: 54 U/L — SIGNIFICANT CHANGE UP (ref 40–120)
ALT FLD-CCNC: 39 U/L — SIGNIFICANT CHANGE UP (ref 12–78)
ANION GAP SERPL CALC-SCNC: 5 MMOL/L — SIGNIFICANT CHANGE UP (ref 5–17)
APPEARANCE UR: CLEAR — SIGNIFICANT CHANGE UP
AST SERPL-CCNC: 29 U/L — SIGNIFICANT CHANGE UP (ref 15–37)
BILIRUB SERPL-MCNC: 0.5 MG/DL — SIGNIFICANT CHANGE UP (ref 0.2–1.2)
BILIRUB UR-MCNC: NEGATIVE — SIGNIFICANT CHANGE UP
BUN SERPL-MCNC: 20 MG/DL — SIGNIFICANT CHANGE UP (ref 7–23)
CALCIUM SERPL-MCNC: 9.4 MG/DL — SIGNIFICANT CHANGE UP (ref 8.5–10.1)
CHLORIDE SERPL-SCNC: 109 MMOL/L — HIGH (ref 96–108)
CO2 SERPL-SCNC: 28 MMOL/L — SIGNIFICANT CHANGE UP (ref 22–31)
COLOR SPEC: YELLOW — SIGNIFICANT CHANGE UP
CREAT SERPL-MCNC: 1.1 MG/DL — SIGNIFICANT CHANGE UP (ref 0.5–1.3)
DIFF PNL FLD: ABNORMAL
GLUCOSE SERPL-MCNC: 132 MG/DL — HIGH (ref 70–99)
GLUCOSE UR QL: NEGATIVE — SIGNIFICANT CHANGE UP
HCT VFR BLD CALC: 44 % — SIGNIFICANT CHANGE UP (ref 39–50)
HGB BLD-MCNC: 14.6 G/DL — SIGNIFICANT CHANGE UP (ref 13–17)
KETONES UR-MCNC: NEGATIVE — SIGNIFICANT CHANGE UP
LEUKOCYTE ESTERASE UR-ACNC: NEGATIVE — SIGNIFICANT CHANGE UP
MCHC RBC-ENTMCNC: 29.9 PG — SIGNIFICANT CHANGE UP (ref 27–34)
MCHC RBC-ENTMCNC: 33.2 GM/DL — SIGNIFICANT CHANGE UP (ref 32–36)
MCV RBC AUTO: 90 FL — SIGNIFICANT CHANGE UP (ref 80–100)
NITRITE UR-MCNC: NEGATIVE — SIGNIFICANT CHANGE UP
NRBC # BLD: 0 /100 WBCS — SIGNIFICANT CHANGE UP (ref 0–0)
PH UR: 5 — SIGNIFICANT CHANGE UP (ref 5–8)
PLATELET # BLD AUTO: 204 K/UL — SIGNIFICANT CHANGE UP (ref 150–400)
POTASSIUM SERPL-MCNC: 4.1 MMOL/L — SIGNIFICANT CHANGE UP (ref 3.5–5.3)
POTASSIUM SERPL-SCNC: 4.1 MMOL/L — SIGNIFICANT CHANGE UP (ref 3.5–5.3)
PROT SERPL-MCNC: 8 G/DL — SIGNIFICANT CHANGE UP (ref 6–8.3)
PROT UR-MCNC: NEGATIVE — SIGNIFICANT CHANGE UP
RBC # BLD: 4.89 M/UL — SIGNIFICANT CHANGE UP (ref 4.2–5.8)
RBC # FLD: 12.6 % — SIGNIFICANT CHANGE UP (ref 10.3–14.5)
SODIUM SERPL-SCNC: 142 MMOL/L — SIGNIFICANT CHANGE UP (ref 135–145)
SP GR SPEC: 1.01 — SIGNIFICANT CHANGE UP (ref 1.01–1.02)
UROBILINOGEN FLD QL: NEGATIVE — SIGNIFICANT CHANGE UP
WBC # BLD: 4.83 K/UL — SIGNIFICANT CHANGE UP (ref 3.8–10.5)
WBC # FLD AUTO: 4.83 K/UL — SIGNIFICANT CHANGE UP (ref 3.8–10.5)

## 2021-10-25 PROCEDURE — 93010 ELECTROCARDIOGRAM REPORT: CPT

## 2021-10-25 PROCEDURE — 81001 URINALYSIS AUTO W/SCOPE: CPT

## 2021-10-25 PROCEDURE — 80053 COMPREHEN METABOLIC PANEL: CPT

## 2021-10-25 PROCEDURE — G0463: CPT

## 2021-10-25 PROCEDURE — 93005 ELECTROCARDIOGRAM TRACING: CPT

## 2021-10-25 PROCEDURE — 36415 COLL VENOUS BLD VENIPUNCTURE: CPT

## 2021-10-25 PROCEDURE — 85027 COMPLETE CBC AUTOMATED: CPT

## 2021-10-25 PROCEDURE — 87086 URINE CULTURE/COLONY COUNT: CPT

## 2021-10-25 NOTE — H&P PST ADULT - NSANTHOSAYNRD_GEN_A_CORE
No. KELLEY screening performed.  STOP BANG Legend: 0-2 = LOW Risk; 3-4 = INTERMEDIATE Risk; 5-8 = HIGH Risk

## 2021-10-25 NOTE — H&P PST ADULT - ASSESSMENT
This is a 60 year old alert and oriented male PMH of high cholesterol and GERD  , presenting with the diagnosis of calculus of ureter,  here today for pre surgical testing.   He is scheduled for Left Renal ESWL by Dr. Bonilla on 11/03/2021.

## 2021-10-25 NOTE — H&P PST ADULT - PROBLEM SELECTOR PLAN 2
*COVID testing to be scheduled within 72 hours of surgery. Site list and instructions provided.  * Following labs ordered-CBC, CMP, UA, urine culture  * Following diagnostics ordered- EKG.  * Going for cardiac clearance with  on 10/28/2021.  * Stop any herbal remedies, vitamin supplements or any medication that contains Aspirin , Ibuprofen, Advil, Motrin or Aleve at least 7 days before surgery.  * Leave all valuables at home.  * No illegal drugs for 7 days prior to surgery and no alcohol 24 hrs before procedure.  * Preop instructions explained. Verbalized understanding.   * Please take the following meds on the day of surgery with sips of water- Omeprazole

## 2021-10-25 NOTE — H&P PST ADULT - PRO ARRIVE FROM
Siri Neves   [place] [address]            11/15/2018      Dear Siri,    Recently you were referred to our Gastroenterology Department by your primary care provider for colorectal cancer screening. I would like to share some important information about a colonoscopy. Colorectal cancer is a leading cause of cancer death in this country. Colorectal cancer can be stopped in its tracks or even prevented with screening tests.    Our office attempted to contact you by phone on 11/15/18.  Please call [phone] to schedule your procedure with one of our Gastroenterology providers at either [facility] in [place] or [facility] in [place]. If you already spoke to someone in the GI Department and have your procedure scheduled you can disregard this letter.     If you decide to have your colonoscopy elsewhere, please call us at [phone] with the information so that we can update your record.    Your good health is important to us, a colonoscopy is important for you.    Sincerely,      [facility]  [address]  [facility] [address]   home

## 2021-10-25 NOTE — H&P PST ADULT - HISTORY OF PRESENT ILLNESS
This is a 1 year old alert and oriented male/female, no significant PMH OR PMH of  , presenting with the diagnosis of  here today for pre surgical testing.   He or She is scheduled for by  on 2021.    Reports having   went for CT scan / MRI  /Ultrasound which confirmed the diagnosis and after consulting with the surgeon agreed to have the above mentioned surgery.    Denies any   This is a 60 year old alert and oriented male PMH of high cholesterol and GERD  , presenting with the diagnosis of calculus of ureter,  here today for pre surgical testing.   He is scheduled for Left Renal ESWL by Dr. Bonilla on 11/03/2021.    Reports having burning sensation to the flank area few weeks ago, had a CT stone hunt and was noted to have a >5mm stone, was advised to go for elective Lithotripsy.  Denies any hematuria or dysuria at this time.

## 2021-10-25 NOTE — H&P PST ADULT - NS ABD PE RECTAL EXAM
Is This A New Presentation, Or A Follow-Up?: Skin Lesions What Type Of Note Output Would You Prefer (Optional)?: Bullet Format How Severe Is Your Skin Lesion?: moderate patient refused

## 2021-10-25 NOTE — H&P PST ADULT - NSICDXFAMILYHX_GEN_ALL_CORE_FT
FAMILY HISTORY:  Mother  Still living? No  FHx: cancer of digestive organ, Age at diagnosis: Age Unknown

## 2021-10-27 ENCOUNTER — NON-APPOINTMENT (OUTPATIENT)
Age: 61
End: 2021-10-27

## 2021-10-27 LAB
CULTURE RESULTS: NO GROWTH — SIGNIFICANT CHANGE UP
SPECIMEN SOURCE: SIGNIFICANT CHANGE UP

## 2021-10-28 PROBLEM — K21.9 GASTRO-ESOPHAGEAL REFLUX DISEASE WITHOUT ESOPHAGITIS: Chronic | Status: ACTIVE | Noted: 2021-10-25

## 2021-10-28 PROBLEM — N20.1 CALCULUS OF URETER: Chronic | Status: ACTIVE | Noted: 2021-10-25

## 2021-10-28 PROBLEM — E78.5 HYPERLIPIDEMIA, UNSPECIFIED: Chronic | Status: ACTIVE | Noted: 2021-10-25

## 2021-10-29 ENCOUNTER — APPOINTMENT (OUTPATIENT)
Dept: CARDIOLOGY | Facility: CLINIC | Age: 61
End: 2021-10-29

## 2021-10-29 ENCOUNTER — NON-APPOINTMENT (OUTPATIENT)
Age: 61
End: 2021-10-29

## 2021-10-29 ENCOUNTER — APPOINTMENT (OUTPATIENT)
Dept: CARDIOLOGY | Facility: CLINIC | Age: 61
End: 2021-10-29
Payer: COMMERCIAL

## 2021-10-29 VITALS
SYSTOLIC BLOOD PRESSURE: 135 MMHG | HEIGHT: 69 IN | DIASTOLIC BLOOD PRESSURE: 85 MMHG | HEART RATE: 83 BPM | BODY MASS INDEX: 24.88 KG/M2 | WEIGHT: 168 LBS | OXYGEN SATURATION: 99 %

## 2021-10-29 PROCEDURE — 99213 OFFICE O/P EST LOW 20 MIN: CPT

## 2021-10-29 PROCEDURE — 93000 ELECTROCARDIOGRAM COMPLETE: CPT

## 2021-11-01 ENCOUNTER — OUTPATIENT (OUTPATIENT)
Dept: OUTPATIENT SERVICES | Facility: HOSPITAL | Age: 61
LOS: 1 days | End: 2021-11-01
Payer: COMMERCIAL

## 2021-11-01 DIAGNOSIS — Z20.828 CONTACT WITH AND (SUSPECTED) EXPOSURE TO OTHER VIRAL COMMUNICABLE DISEASES: ICD-10-CM

## 2021-11-01 DIAGNOSIS — Z98.890 OTHER SPECIFIED POSTPROCEDURAL STATES: Chronic | ICD-10-CM

## 2021-11-01 LAB — SARS-COV-2 RNA SPEC QL NAA+PROBE: SIGNIFICANT CHANGE UP

## 2021-11-01 PROCEDURE — U0005: CPT

## 2021-11-01 PROCEDURE — U0003: CPT

## 2021-11-02 ENCOUNTER — TRANSCRIPTION ENCOUNTER (OUTPATIENT)
Age: 61
End: 2021-11-02

## 2021-11-02 NOTE — ASU PATIENT PROFILE, ADULT - NSICDXPASTMEDICALHX_GEN_ALL_CORE_FT
PAST MEDICAL HISTORY:  Calculus of ureter     GERD (gastroesophageal reflux disease)     HLD (hyperlipidemia)

## 2021-11-03 ENCOUNTER — OUTPATIENT (OUTPATIENT)
Dept: OUTPATIENT SERVICES | Facility: HOSPITAL | Age: 61
LOS: 1 days | End: 2021-11-03
Payer: COMMERCIAL

## 2021-11-03 VITALS
SYSTOLIC BLOOD PRESSURE: 147 MMHG | WEIGHT: 169.98 LBS | TEMPERATURE: 98 F | HEIGHT: 69 IN | OXYGEN SATURATION: 97 % | DIASTOLIC BLOOD PRESSURE: 84 MMHG | HEART RATE: 91 BPM | RESPIRATION RATE: 22 BRPM

## 2021-11-03 VITALS
HEART RATE: 75 BPM | RESPIRATION RATE: 18 BRPM | OXYGEN SATURATION: 98 % | SYSTOLIC BLOOD PRESSURE: 118 MMHG | DIASTOLIC BLOOD PRESSURE: 73 MMHG

## 2021-11-03 DIAGNOSIS — Z98.890 OTHER SPECIFIED POSTPROCEDURAL STATES: Chronic | ICD-10-CM

## 2021-11-03 DIAGNOSIS — N20.1 CALCULUS OF URETER: ICD-10-CM

## 2021-11-03 PROCEDURE — 50590 FRAGMENTING OF KIDNEY STONE: CPT | Mod: LT

## 2021-11-03 RX ORDER — OXYCODONE HYDROCHLORIDE 5 MG/1
5 TABLET ORAL ONCE
Refills: 0 | Status: DISCONTINUED | OUTPATIENT
Start: 2021-11-03 | End: 2021-11-03

## 2021-11-03 RX ORDER — SODIUM CHLORIDE 9 MG/ML
1000 INJECTION, SOLUTION INTRAVENOUS
Refills: 0 | Status: DISCONTINUED | OUTPATIENT
Start: 2021-11-03 | End: 2021-11-03

## 2021-11-03 RX ORDER — ONDANSETRON 8 MG/1
4 TABLET, FILM COATED ORAL ONCE
Refills: 0 | Status: DISCONTINUED | OUTPATIENT
Start: 2021-11-03 | End: 2021-11-03

## 2021-11-03 RX ORDER — HYDROMORPHONE HYDROCHLORIDE 2 MG/ML
0.5 INJECTION INTRAMUSCULAR; INTRAVENOUS; SUBCUTANEOUS
Refills: 0 | Status: DISCONTINUED | OUTPATIENT
Start: 2021-11-03 | End: 2021-11-03

## 2021-11-03 RX ORDER — CIPROFLOXACIN LACTATE 400MG/40ML
400 VIAL (ML) INTRAVENOUS ONCE
Refills: 0 | Status: DISCONTINUED | OUTPATIENT
Start: 2021-11-03 | End: 2021-11-03

## 2021-11-03 RX ADMIN — SODIUM CHLORIDE 75 MILLILITER(S): 9 INJECTION, SOLUTION INTRAVENOUS at 06:42

## 2021-11-03 NOTE — ASU DISCHARGE PLAN (ADULT/PEDIATRIC) - COMMENTS
Have Xray (KUB) done in 2 weeks (around 11/18). Script for xray provided today. I will be in touch after I have reviewed the films.

## 2021-11-03 NOTE — ASU DISCHARGE PLAN (ADULT/PEDIATRIC) - CARE PROVIDER_API CALL
Clemente Bonilla)  Urology  [address], [address]  [place] [address]  Phone: [phone]  Fax: [phone]  Follow Up Time:

## 2021-11-03 NOTE — ASU PREOP CHECKLIST - NOTHING BY MOUTH SINCE
HPI: 39year old male presents c/o Patient presents with: Foot Pain: x1.5 years    (NP). VSS. C/o R foot pain x 2 weeks. Hx of same pain x 1.5 years episodically.  Was told he had gout before and gets treatment with a MDP (allergy to NSAIDs/ASA) and sympto murmur, click, rub, or gallop. Abdomen: Soft, nontender. Bowel sounds normal. No masses, No hepatosplenomegaly. No flank pain. No rebound/rigidity. Musc:  No joint erythema/edema. Normal muscular development. Normal gait.   Extremities: Extremities normal 02-Nov-2021 21:30

## 2021-11-03 NOTE — BRIEF OPERATIVE NOTE - NSICDXBRIEFPROCEDURE_GEN_ALL_CORE_FT
PROCEDURES:  Extracorporeal shock wave lithotripsy (ESWL) 03-Nov-2021 08:23:54 left renal Clemente Bonilla

## 2021-11-26 ENCOUNTER — TRANSCRIPTION ENCOUNTER (OUTPATIENT)
Age: 61
End: 2021-11-26

## 2021-12-04 ENCOUNTER — OUTPATIENT (OUTPATIENT)
Dept: OUTPATIENT SERVICES | Facility: HOSPITAL | Age: 61
LOS: 1 days | End: 2021-12-04
Payer: COMMERCIAL

## 2021-12-04 ENCOUNTER — APPOINTMENT (OUTPATIENT)
Dept: RADIOLOGY | Facility: CLINIC | Age: 61
End: 2021-12-04
Payer: COMMERCIAL

## 2021-12-04 DIAGNOSIS — Z98.890 OTHER SPECIFIED POSTPROCEDURAL STATES: Chronic | ICD-10-CM

## 2021-12-04 DIAGNOSIS — Z00.8 ENCOUNTER FOR OTHER GENERAL EXAMINATION: ICD-10-CM

## 2021-12-04 PROCEDURE — 74018 RADEX ABDOMEN 1 VIEW: CPT | Mod: 26

## 2021-12-04 PROCEDURE — 74018 RADEX ABDOMEN 1 VIEW: CPT

## 2021-12-20 LAB
ESTIMATED AVERAGE GLUCOSE: 114 MG/DL
HBA1C MFR BLD HPLC: 5.6 %

## 2022-02-04 NOTE — H&P PST ADULT - NSICDXPASTMEDICALHX_GEN_ALL_CORE_FT
given to family
PAST MEDICAL HISTORY:  Calculus of ureter     GERD (gastroesophageal reflux disease)     HLD (hyperlipidemia)

## 2022-02-07 ENCOUNTER — APPOINTMENT (OUTPATIENT)
Dept: INTERNAL MEDICINE | Facility: CLINIC | Age: 62
End: 2022-02-07
Payer: COMMERCIAL

## 2022-02-07 ENCOUNTER — LABORATORY RESULT (OUTPATIENT)
Age: 62
End: 2022-02-07

## 2022-02-07 VITALS
HEIGHT: 69 IN | SYSTOLIC BLOOD PRESSURE: 137 MMHG | BODY MASS INDEX: 24.88 KG/M2 | DIASTOLIC BLOOD PRESSURE: 82 MMHG | HEART RATE: 71 BPM | WEIGHT: 168 LBS | OXYGEN SATURATION: 98 % | RESPIRATION RATE: 14 BRPM | TEMPERATURE: 97.8 F

## 2022-02-07 DIAGNOSIS — Z87.442 PERSONAL HISTORY OF URINARY CALCULI: ICD-10-CM

## 2022-02-07 DIAGNOSIS — Z01.84 ENCOUNTER FOR ANTIBODY RESPONSE EXAMINATION: ICD-10-CM

## 2022-02-07 PROCEDURE — 99396 PREV VISIT EST AGE 40-64: CPT | Mod: 25

## 2022-02-07 PROCEDURE — 36415 COLL VENOUS BLD VENIPUNCTURE: CPT

## 2022-02-07 RX ORDER — NAPROXEN 500 MG/1
500 TABLET ORAL TWICE DAILY
Qty: 20 | Refills: 3 | Status: ACTIVE | COMMUNITY
Start: 2019-01-25 | End: 1900-01-01

## 2022-02-10 ENCOUNTER — TRANSCRIPTION ENCOUNTER (OUTPATIENT)
Age: 62
End: 2022-02-10

## 2022-02-10 LAB
25(OH)D3 SERPL-MCNC: 34.4 NG/ML
ALBUMIN SERPL ELPH-MCNC: 4.5 G/DL
ALP BLD-CCNC: 58 U/L
ALT SERPL-CCNC: 29 U/L
ANION GAP SERPL CALC-SCNC: 14 MMOL/L
APPEARANCE: ABNORMAL
AST SERPL-CCNC: 27 U/L
BASOPHILS # BLD AUTO: 0.03 K/UL
BASOPHILS NFR BLD AUTO: 0.6 %
BILIRUB SERPL-MCNC: 0.6 MG/DL
BILIRUBIN URINE: NEGATIVE
BLOOD URINE: ABNORMAL
BUN SERPL-MCNC: 16 MG/DL
CALCIUM SERPL-MCNC: 9.4 MG/DL
CHLORIDE SERPL-SCNC: 107 MMOL/L
CHOLEST SERPL-MCNC: 165 MG/DL
CO2 SERPL-SCNC: 22 MMOL/L
COLOR: NORMAL
CREAT SERPL-MCNC: 1.09 MG/DL
EOSINOPHIL # BLD AUTO: 0.13 K/UL
EOSINOPHIL NFR BLD AUTO: 2.6 %
GLUCOSE QUALITATIVE U: NEGATIVE
GLUCOSE SERPL-MCNC: 107 MG/DL
HCT VFR BLD CALC: 42.8 %
HDLC SERPL-MCNC: 54 MG/DL
HGB BLD-MCNC: 14.5 G/DL
IMM GRANULOCYTES NFR BLD AUTO: 0.8 %
KETONES URINE: NEGATIVE
LDLC SERPL CALC-MCNC: 98 MG/DL
LEUKOCYTE ESTERASE URINE: NEGATIVE
LYMPHOCYTES # BLD AUTO: 1.4 K/UL
LYMPHOCYTES NFR BLD AUTO: 27.6 %
MAN DIFF?: NORMAL
MCHC RBC-ENTMCNC: 30.7 PG
MCHC RBC-ENTMCNC: 33.9 GM/DL
MCV RBC AUTO: 90.5 FL
MONOCYTES # BLD AUTO: 0.64 K/UL
MONOCYTES NFR BLD AUTO: 12.6 %
NEUTROPHILS # BLD AUTO: 2.84 K/UL
NEUTROPHILS NFR BLD AUTO: 55.8 %
NITRITE URINE: NEGATIVE
NONHDLC SERPL-MCNC: 111 MG/DL
PH URINE: 5
PLATELET # BLD AUTO: 218 K/UL
POTASSIUM SERPL-SCNC: 4.2 MMOL/L
PROT SERPL-MCNC: 6.8 G/DL
PROTEIN URINE: NEGATIVE
PSA SERPL-MCNC: 4.89 NG/ML
RBC # BLD: 4.73 M/UL
RBC # FLD: 12.4 %
SODIUM SERPL-SCNC: 142 MMOL/L
SPECIFIC GRAVITY URINE: 1.02
TRIGL SERPL-MCNC: 66 MG/DL
TSH SERPL-ACNC: 1.17 UIU/ML
UROBILINOGEN URINE: NORMAL
WBC # FLD AUTO: 5.08 K/UL

## 2022-06-27 ENCOUNTER — NON-APPOINTMENT (OUTPATIENT)
Age: 62
End: 2022-06-27

## 2022-07-05 ENCOUNTER — APPOINTMENT (OUTPATIENT)
Dept: ORTHOPEDIC SURGERY | Facility: CLINIC | Age: 62
End: 2022-07-05

## 2022-07-05 VITALS — BODY MASS INDEX: 24.88 KG/M2 | WEIGHT: 168 LBS | HEIGHT: 69 IN

## 2022-07-05 DIAGNOSIS — M75.32 CALCIFIC TENDINITIS OF LEFT SHOULDER: ICD-10-CM

## 2022-07-05 PROCEDURE — J3490M: CUSTOM

## 2022-07-05 PROCEDURE — 99204 OFFICE O/P NEW MOD 45 MIN: CPT | Mod: 25

## 2022-07-05 PROCEDURE — 20611 DRAIN/INJ JOINT/BURSA W/US: CPT

## 2022-07-05 PROCEDURE — 73010 X-RAY EXAM OF SHOULDER BLADE: CPT | Mod: LT

## 2022-07-05 PROCEDURE — 72040 X-RAY EXAM NECK SPINE 2-3 VW: CPT

## 2022-07-05 PROCEDURE — 73030 X-RAY EXAM OF SHOULDER: CPT | Mod: LT

## 2022-07-05 NOTE — ASSESSMENT
[FreeTextEntry1] : Left X-Ray Examination of the SHOULDER (2 views):  no fractures, subluxations or dislocations. calcx density\par X-Ray Examination of the SCAPULA 1 or 2 views shows: no significant abnormalities\par X-Ray Examination of the CERVICAL SPINE 3 views (or less) shows: straightening consistent with spasm and disc space narrowing. \par \par \par \par - We discussed their diagnosis and treatment options at length. \par - Pertinent aspects of the natural history of calcific tendonitis were reviewed with the patient.  I also reviewed with the patient that this condition is often associated with diabetes mellitus and thyroid disorders. \par - We will continue conservative treatment with a course of PT, icing, and anti-inflammatory medication.\par - Physical Therapy to work on ROM, scapular strengthening, and rotator cuff strengthening along with a home exercise program.\par - The patient was advised to let pain guide the gradual advancement of activities. \par - The risks, benefits, and alternatives to corticosteroid injection were reviewed with the patient and they wished to proceed with this treatment course. \par - Follow up as needed in 6 weeks to re-evaluate progress\par \par \par \par

## 2022-07-05 NOTE — HISTORY OF PRESENT ILLNESS
[de-identified] : 61 year old male  (RHD, )  neck into left shoulder and neck pain beginning on 6/25/2022, noted while sleeping. pain located at the left upper trap and cervical area and lateral aspect of left shoulder with associated radiation and tingling.worse at night while sleeping.  has tried Advil, bio freeze, icing and heat.\par

## 2022-07-05 NOTE — IMAGING
[de-identified] : NECK:\par Inspection: no ecchymosis. \par Palpation: trapezial tenderness. \par Range of motion:  Full range of motion with mild stiffness . Pain at extremes of rotation to left. \par Strength Testing: Weakness with Left Finger Abductors and Grasp\par Normal Deltoid, Biceps, Triceps, Wrist Flexors\par Neurological testing: light touch is intact throughout both upper extremities\par Coppola reflex: neg\par Spurling test: positive\par \par \par LEFT SHOULDER\par Inspection: No swelling. \par Palpation: Tenderness is noted at the bicipital groove, anterior and lateral. \par Range of motion: There is pain with range of motion.\par , ER 55, @90ER 90, @90IR 30\par Strength: There is pain and discomfort with strength testing.\par Forward Flexion 4/5. Abduction 4/5.  External Rotation 5-/5 and Internal Rotation 5/5 \par Neurological testings: motor and sensor intact distally.\par Ligament Stability and Special Tests: \par There is positive arc of pain. \par Shoulder apprehension: neg\par Shoulder relocation: neg\par Mcgregor’s test: pos\par Biceps Active test: neg\par Youngblood Labral Shear: neg\par Impingement testing: pos\par Laura testing: pos\par Whipple: pos\par Cross Body Adduction: neg\par \par

## 2023-01-20 ENCOUNTER — APPOINTMENT (OUTPATIENT)
Dept: UROLOGY | Facility: CLINIC | Age: 63
End: 2023-01-20
Payer: COMMERCIAL

## 2023-01-20 PROCEDURE — 99204 OFFICE O/P NEW MOD 45 MIN: CPT

## 2023-01-20 NOTE — PHYSICAL EXAM
[General Appearance - Well Developed] : well developed [General Appearance - Well Nourished] : well nourished [Normal Appearance] : normal appearance [Well Groomed] : well groomed [General Appearance - In No Acute Distress] : no acute distress [Edema] : no peripheral edema [Respiration, Rhythm And Depth] : normal respiratory rhythm and effort [Exaggerated Use Of Accessory Muscles For Inspiration] : no accessory muscle use [Abdomen Soft] : soft [Abdomen Tenderness] : non-tender [Costovertebral Angle Tenderness] : no ~M costovertebral angle tenderness [Urethral Meatus] : meatus normal [Penis Abnormality] : normal circumcised penis [Urinary Bladder Findings] : the bladder was normal on palpation [Scrotum] : the scrotum was normal [Testes Mass (___cm)] : there were no testicular masses [No Prostate Nodules] : no prostate nodules [Prostate Size ___ (0-4)] : prostate size [unfilled] (scale: 0-4) [Normal Station and Gait] : the gait and station were normal for the patient's age [] : no rash [No Focal Deficits] : no focal deficits [Oriented To Time, Place, And Person] : oriented to person, place, and time [Affect] : the affect was normal [Mood] : the mood was normal [Not Anxious] : not anxious

## 2023-01-20 NOTE — HISTORY OF PRESENT ILLNESS
[None] : no symptoms [FreeTextEntry1] : LEYDI LIN is a 62 year M who presents for eval/f/u stones, elevated psa. Has had stone episodes, surgery, and prostate biopsy for mild elevation. First stone episode 12/2018---> right side, passed stone, never knew composition. Had cystoscopy office 9/2021. ESWL 2021 unsure of the side. Has had CT, and office renal US recently.\par \par No pain now. Has intermittent gross hematuria, but not recently. Some hematospermia, also intermittent (but had prostate biopsy in the past).\par \par Switching care to [facility], to establish care here.\par \par PMH: stones, elev psa, gerd and mild cholesterol\par PSH: hernia surgery, eswl, prostate biopsy\par FH: no sig  FH known.\par Meds: rosuvastatin, omeprazole\par SH: no special diet, never a smoker, minimal caffeine intake\par Allergies: penicillin, clindamycin, nicoral cream\par \par PSA\par 4.89-- 2/2022\par 4.82-- 2/2021\par 3.85-- 1/2020\par 1.81-- 1/2019\par 2.32-- 12/2017

## 2023-01-20 NOTE — ASSESSMENT
[FreeTextEntry1] : PSA hx noted\par no focal exam findings\par \par 9/2021 imaging: CT with left lower renal stone, left kidney malrotated. Stone density- ~ 500 HU mean, ~ 1 cm. Pt with ESWL 11/2021. \par KUB 12/2021- no stone visualized\par \par verbal report ? small fragments stone on US 10/2022.\par \par 1. psa free/total-, u/a will review by phone\par 2. Schedule renal US for f/u- to evaluate, either at radiology or in office\par 3. Diet modification reviewed at length- increasing fluids (primarily water), citrus is good, and decreasing/moderating salt, animal flesh protein, oxalate containing foods, and moderation of calcium intake (1000 mg/day is USRDA).\par \par I reviewed with the patient the risks of metabolic stone disease given their underlying risk parameters (all of which include large stones, multiple stones, bilateral stones, family history, and young age), and the indications for 24 hour urine metabolic assessment.\par \par DASH diet a good overall diet plan to consider and review for general health and stone health.\par \par We also discussed benefits of regular exercise and weight loss as independent risk reducers for stones.\par 4.  24 hour urine collection x 2 in the next few weeks\par 5. RTC with renal US in 8 weeks\par

## 2023-01-21 LAB
APPEARANCE: CLEAR
BACTERIA: NEGATIVE
BILIRUBIN URINE: NEGATIVE
BLOOD URINE: ABNORMAL
COLOR: NORMAL
GLUCOSE QUALITATIVE U: NEGATIVE
HYALINE CASTS: 0 /LPF
KETONES URINE: NEGATIVE
LEUKOCYTE ESTERASE URINE: NEGATIVE
MICROSCOPIC-UA: NORMAL
NITRITE URINE: NEGATIVE
PH URINE: 7
PROTEIN URINE: NORMAL
PSA FREE FLD-MCNC: 14 %
PSA FREE SERPL-MCNC: 0.66 NG/ML
PSA SERPL-MCNC: 4.83 NG/ML
RED BLOOD CELLS URINE: 14 /HPF
SPECIFIC GRAVITY URINE: 1.02
SQUAMOUS EPITHELIAL CELLS: 0 /HPF
UROBILINOGEN URINE: NORMAL
WHITE BLOOD CELLS URINE: 1 /HPF

## 2023-01-24 ENCOUNTER — NON-APPOINTMENT (OUTPATIENT)
Age: 63
End: 2023-01-24

## 2023-02-06 RX ORDER — OMEPRAZOLE 20 MG/1
20 CAPSULE, DELAYED RELEASE ORAL
Qty: 90 | Refills: 3 | Status: ACTIVE | COMMUNITY
Start: 1900-01-01 | End: 1900-01-01

## 2023-02-27 ENCOUNTER — LABORATORY RESULT (OUTPATIENT)
Age: 63
End: 2023-02-27

## 2023-03-03 ENCOUNTER — TRANSCRIPTION ENCOUNTER (OUTPATIENT)
Age: 63
End: 2023-03-03

## 2023-03-10 ENCOUNTER — TRANSCRIPTION ENCOUNTER (OUTPATIENT)
Age: 63
End: 2023-03-10

## 2023-03-14 ENCOUNTER — NON-APPOINTMENT (OUTPATIENT)
Age: 63
End: 2023-03-14

## 2023-03-23 ENCOUNTER — APPOINTMENT (OUTPATIENT)
Dept: INTERNAL MEDICINE | Facility: CLINIC | Age: 63
End: 2023-03-23
Payer: COMMERCIAL

## 2023-03-23 VITALS
WEIGHT: 168 LBS | HEART RATE: 70 BPM | SYSTOLIC BLOOD PRESSURE: 122 MMHG | BODY MASS INDEX: 24.88 KG/M2 | HEIGHT: 69 IN | TEMPERATURE: 98.2 F | OXYGEN SATURATION: 97 % | DIASTOLIC BLOOD PRESSURE: 80 MMHG

## 2023-03-23 PROCEDURE — 99213 OFFICE O/P EST LOW 20 MIN: CPT

## 2023-03-23 NOTE — HISTORY OF PRESENT ILLNESS
[FreeTextEntry8] : 62 year old male here for persistent diarrhea. Came back from [place] 3/10 started 3/11. diarrhea twice a day. no fevers no chills no blood in stool. Patient unsure if he ate or drank anything out of the normal. Some " aching in the belly" no cramping no tenderness. Patient states it seems to be getting better but not back to normal. No chest pain no sob

## 2023-03-29 ENCOUNTER — APPOINTMENT (OUTPATIENT)
Dept: UROLOGY | Facility: CLINIC | Age: 63
End: 2023-03-29
Payer: COMMERCIAL

## 2023-03-29 ENCOUNTER — OUTPATIENT (OUTPATIENT)
Dept: OUTPATIENT SERVICES | Facility: HOSPITAL | Age: 63
LOS: 1 days | End: 2023-03-29
Payer: COMMERCIAL

## 2023-03-29 DIAGNOSIS — R35.0 FREQUENCY OF MICTURITION: ICD-10-CM

## 2023-03-29 DIAGNOSIS — Z98.890 OTHER SPECIFIED POSTPROCEDURAL STATES: Chronic | ICD-10-CM

## 2023-03-29 PROCEDURE — 76775 US EXAM ABDO BACK WALL LIM: CPT | Mod: 26

## 2023-03-29 PROCEDURE — 76775 US EXAM ABDO BACK WALL LIM: CPT

## 2023-03-29 PROCEDURE — 99214 OFFICE O/P EST MOD 30 MIN: CPT | Mod: 25

## 2023-03-31 ENCOUNTER — APPOINTMENT (OUTPATIENT)
Dept: ORTHOPEDIC SURGERY | Facility: CLINIC | Age: 63
End: 2023-03-31
Payer: COMMERCIAL

## 2023-03-31 VITALS — HEIGHT: 69 IN | BODY MASS INDEX: 24.73 KG/M2 | WEIGHT: 167 LBS

## 2023-03-31 DIAGNOSIS — M62.838 OTHER MUSCLE SPASM: ICD-10-CM

## 2023-03-31 LAB — BACTERIA UR CULT: NORMAL

## 2023-03-31 PROCEDURE — 73030 X-RAY EXAM OF SHOULDER: CPT | Mod: LT

## 2023-03-31 PROCEDURE — 73010 X-RAY EXAM OF SHOULDER BLADE: CPT | Mod: LT

## 2023-03-31 PROCEDURE — 99213 OFFICE O/P EST LOW 20 MIN: CPT

## 2023-03-31 RX ORDER — CYCLOBENZAPRINE HYDROCHLORIDE 5 MG/1
5 TABLET, FILM COATED ORAL
Qty: 30 | Refills: 1 | Status: COMPLETED | COMMUNITY
Start: 2023-03-31 | End: 2023-05-30

## 2023-03-31 NOTE — ASSESSMENT
[FreeTextEntry1] : ATRAUMATIC LEFT NECK/TRAP SPASM X 3 DAYS\par SHOULDER XRAYS WITH CALCIUM DEPOSIT TODAY\par PRIOR CERVICAL XRAYS WITH STRAIGHTENING AND MULTILEVEL DDD\par SHOULDER EXAM NORMAL TODAY\par MDP AND FLEXERIL RX\par PT RX\par DISCUSSED MRI IF SYMPTOMS PERSIST WITH PAIN MGMT/SPINE FOLLOW UP\par

## 2023-03-31 NOTE — HISTORY OF PRESENT ILLNESS
[Gradual] : gradual [7] : 7 [1] : 2 [Localized] : localized [Intermittent] : intermittent [Household chores] : household chores [Heat] : heat [Injection therapy] : injection therapy [Lying in bed] : lying in bed [de-identified] : 03/31/2023 PT PRESENTS HERE TODAY WITH  LEFT SHOULDER PAIN SINCE 3 DAYS AGO\par NO INJURY \par H/O CALCIFIC TENDINITIS BACK IN JULY 2022\par HAD CSI WITH GOOD RELIEF \par PAIN STARTS IN LEFT POSTERIOR NECK AND GOES DOWN LEFT SCAPULA, NO N/T [] : no [FreeTextEntry1] : LEFT SHOULDER  [de-identified] : DR SPANGLER [de-identified] : CSI

## 2023-03-31 NOTE — IMAGING
[Left] : left shoulder [There are no fractures, subluxations or dislocations. No significant abnormalities are seen] : There are no fractures, subluxations or dislocations. No significant abnormalities are seen [Calcific density] : Calcific density [de-identified] : LEFT SHOULDER\par No swelling, no ecchymosis, no deformity, no scapular winging.\par No tenderness to palpation over shoulder, AC joint or trapezius.\par Forward flexion to 180; external rotation to 90 degrees (with shoulder abducted); internal rotation to 70 degrees (with shoulder abducted)\par 5/5 supraspinatus, infraspinatus and subscapularis\par Negative Silva test, negative impingement sign, negative O’Law test.\par Speed’s and yergason negative\par Motor and sensory intact distally\par \par CERVICAL\par No swelling, no ecchymosis\par TRAP TTP\par Full range of motion with no pain, MILD STIFFNESS\par 5/5 deltoid, biceps, triceps and wrist flexors/extensors\par Negative spurling, negative chairez\par Reflexes +2, intact motor and sensory distally\par

## 2023-04-01 ENCOUNTER — APPOINTMENT (OUTPATIENT)
Dept: CT IMAGING | Facility: CLINIC | Age: 63
End: 2023-04-01
Payer: COMMERCIAL

## 2023-04-01 ENCOUNTER — OUTPATIENT (OUTPATIENT)
Dept: OUTPATIENT SERVICES | Facility: HOSPITAL | Age: 63
LOS: 1 days | End: 2023-04-01
Payer: COMMERCIAL

## 2023-04-01 DIAGNOSIS — Z00.8 ENCOUNTER FOR OTHER GENERAL EXAMINATION: ICD-10-CM

## 2023-04-01 DIAGNOSIS — N20.0 CALCULUS OF KIDNEY: ICD-10-CM

## 2023-04-01 DIAGNOSIS — Z98.890 OTHER SPECIFIED POSTPROCEDURAL STATES: Chronic | ICD-10-CM

## 2023-04-01 PROCEDURE — 74176 CT ABD & PELVIS W/O CONTRAST: CPT

## 2023-04-01 PROCEDURE — 74176 CT ABD & PELVIS W/O CONTRAST: CPT | Mod: 26

## 2023-04-03 NOTE — ASSESSMENT
[FreeTextEntry1] : Renal US today reviewed: appears malrotated left kidney, with hydroureteronephrosis and likely stones in proximal ureter up to ~ 1.4 cm. 5-6 mm right mid renal echogenic focus with shadowing and twinkling, c/w stone. No solid renal mass.\par \par 24 hr urine- 1 st day- good volume 3000, sodium 254 , calcium 117.8, good citrate 989, oxlate 78\par 2nd- good volume 3100, sodium 378, calcium 192, citrate 992, oxalate 44\par \par Pt does state that when he collected the 24 hr urine, each container had about 1.2 L and 1.4 L of urine output \par \par Vitamin B 100 mg daily\par \par plan \par 1) CT stone hunt for eval current stones\par 2) Review with Telehealth in 2 weeks

## 2023-04-03 NOTE — HISTORY OF PRESENT ILLNESS
[FreeTextEntry1] : 62 year M who presents for eval/f/u stones, elevated psa. Has had stone episodes, surgery, and prostate biopsy for mild elevation. First stone episode 12/2018---> right side, passed stone, never knew composition. Had cystoscopy office 9/2021. ESWL 2021 unsure of the side. Has had CT, and office renal US recently.\par \par No pain now. Has intermittent gross hematuria, but not recently. Some hematospermia, also intermittent (but had prostate biopsy in the past).\par \par Switching care to [facility], to establish care here.\par \par PMH: stones, elev psa, gerd and mild cholesterol\par PSH: hernia surgery, eswl, prostate biopsy\par FH: no sig  FH known.\par Meds: rosuvastatin, omeprazole\par SH: no special diet, never a smoker, minimal caffeine intake\par Allergies: penicillin, clindamycin, nicoral cream\par \par PSA\par 4.89-- 2/2022\par 4.82-- 2/2021\par 3.85-- 1/2020\par 1.81-- 1/2019\par 2.32-- 12/2017 \par  [None] : no symptoms

## 2023-04-04 DIAGNOSIS — N20.0 CALCULUS OF KIDNEY: ICD-10-CM

## 2023-04-04 DIAGNOSIS — R97.20 ELEVATED PROSTATE SPECIFIC ANTIGEN [PSA]: ICD-10-CM

## 2023-04-04 DIAGNOSIS — R35.1 NOCTURIA: ICD-10-CM

## 2023-04-11 ENCOUNTER — APPOINTMENT (OUTPATIENT)
Dept: UROLOGY | Facility: CLINIC | Age: 63
End: 2023-04-11
Payer: COMMERCIAL

## 2023-04-11 PROCEDURE — 99443: CPT

## 2023-04-14 NOTE — HISTORY OF PRESENT ILLNESS
[Other Location: e.g. School (Enter Location, City,State)___] : at [unfilled], at the time of the visit. [Other Location: e.g. Home (Enter Location, City,State)___] : at [unfilled] [Verbal consent obtained from patient] : the patient, [unfilled] [FreeTextEntry1] : The patient-doctor relationship has been established in a face to face fashion via real time video/audio HIPAA compliant communication using telemedicine software. The patient's identity has been confirmed. The patient was previously emailed a copy of the telemedicine consent. They have had a chance to review and has now given verbal consent and has requested care to be assessed and treated via telemedicine. They understand there may be limitations in this process, and that they may need further followup care in the office and/or hospital settings.\par \par Verbal consent given on Apr 11 2023 12:40PM\par \par LEYDI LIN is a 62 year M who presents for f/u of CT scan for stone.\par \par CT reveals mult stones left kidney, which is malrotated. Total stone burden > 2.0 cm. Mild pelviectasis, possible mild hydro. Ureter appears to travel via anterior course over kidney to renal pelvis. Largest stones 9-t0 mm, min 5 stones, aggregate in coronal 2.8 cm. HU density 544 mean\par \par \par 04/11/2023 \par \par The patient denies fevers, chills, nausea and/or vomiting, and no unexplained weight loss.\par \par All pertinent parts of the patient PFSH (past medical, family, and social histories), laboratory, radiological studies and available physician notes were reviewed prior to starting the face-to-face portion of the telemedicine visit. Questionnaire results, where appropriate, were discussed with the patient.\par

## 2023-04-14 NOTE — ASSESSMENT
[FreeTextEntry1] : Films reviewed.\par \par I had long discussion with patient about their stones, and about options, risks, and benefits of all treatments.  Main options for definitive stone treatment include ESWL, URS, PCNL.  \par \par ESWL success best with smaller, less dense stones, and with short skin to stone distance and favorable location of the stone within the urinary tract, while URS is more successful treatment with multiple stones, more dense stones, or challenging body habitus or stone location.  PCNL is best option for larger, more dense and complex stones, and particularly those involving the lower pole.  Non-definitive stone treatment options for drainage, using either stents or nephrostomy, also reviewed: these are of lower immediate surgical risks, but incur multiple procedures to manage and may have their own complications and effects on quality of life.  Still, nephrostomy or nephroureteral catheter can allow maintenance of urinary system drainage without surgical risks, and management in office with exchanges (avoiding the anesthesia and testing which would be present with bilateral internal stent exchange).\par \par Risks of nontreatment with obstruction can lead to very high rate of renal function loss in stone-bearing kidney over the next months to years.\par \par In this patient's case, I recommend...left PCNL via upper pole approach given malrotation of kidney/anatomy, and total stone burden\par \par Risks/benefits/success/recovery expectations all reviewed at length, particularly with respect to patient's comorbidities, and inclusive of infection/sepsis, bleeding, need for secondary procedures or secondary stages such as embolization or open surgery, and even risks of death due to acute issues superimposed on comorbidities.\par \par Pt prefers to consider, and will call once he chooses treatment type, or f/u further\par

## 2023-04-18 ENCOUNTER — APPOINTMENT (OUTPATIENT)
Dept: UROLOGY | Facility: CLINIC | Age: 63
End: 2023-04-18
Payer: COMMERCIAL

## 2023-04-18 PROCEDURE — 99443: CPT

## 2023-04-18 NOTE — HISTORY OF PRESENT ILLNESS
[FreeTextEntry1] : The patient-doctor relationship has been established in a face to face fashion via real time video/audio HIPAA compliant communication using telemedicine software. The patient's identity has been confirmed. The patient was previously emailed a copy of the telemedicine consent. They have had a chance to review and has now given verbal consent and has requested care to be assessed and treated via telemedicine. 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 Total stone burden > 2.0 cm. Mild pelviectasis, possible mild hydro. Ureter appears to travel via anterior course over kidney to renal pelvis. Largest stones 9-t0 mm, min 5 stones, aggregate in coronal 2.8 cm. HU density 544 mean\par \par 04/18/2023 \par \par The patient denies fevers, chills, nausea and/or vomiting, and no unexplained weight loss.\par \par All pertinent parts of the patient PFSH (past medical, family, and social histories), laboratory, radiological studies and available physician notes were reviewed prior to starting the face-to-face portion of the telemedicine visit. Questionnaire results, where appropriate, were discussed with the patient.\par  [Other Location: e.g. School (Enter Location, City,State)___] : at [unfilled], at the time of the visit. [Other Location: e.g. Home (Enter Location, City,State)___] : at [unfilled] [Verbal consent obtained from patient] : the patient, [unfilled]

## 2023-04-18 NOTE — ASSESSMENT
[FreeTextEntry1] : Discussed again, with specific questions answered about options:\par \par I had long discussion with patient about their stones, and about options, risks, and benefits of all treatments. Main options for definitive stone treatment include ESWL, URS, PCNL. \par \par ESWL success best with smaller, less dense stones, and with short skin to stone distance and favorable location of the stone within the urinary tract, while URS is more successful treatment with multiple stones, more dense stones, or challenging body habitus or stone location. PCNL is best option for larger, more dense and complex stones, and particularly those involving the lower pole.\par \par In Mr. King case, I recommend...left PCNL via upper pole approach given malrotation of kidney/anatomy, and total stone burden, if we are to treat at this time- most definitive and least likely to require multiple procedures\par \par Risks/benefits/success/recovery expectations all reviewed at length, particularly with respect to patient's comorbidities, and inclusive of infection/sepsis, bleeding, need for secondary procedures or secondary stages such as embolization or open surgery, and even risks of death due to acute issues superimposed on comorbidities.\par \par pt in agreement- will schedule, med/card clearance as needed

## 2023-04-24 ENCOUNTER — OUTPATIENT (OUTPATIENT)
Dept: OUTPATIENT SERVICES | Facility: HOSPITAL | Age: 63
LOS: 1 days | End: 2023-04-24
Payer: COMMERCIAL

## 2023-04-24 VITALS
HEART RATE: 73 BPM | TEMPERATURE: 98 F | OXYGEN SATURATION: 98 % | SYSTOLIC BLOOD PRESSURE: 129 MMHG | DIASTOLIC BLOOD PRESSURE: 75 MMHG | WEIGHT: 167.99 LBS | HEIGHT: 69 IN | RESPIRATION RATE: 18 BRPM

## 2023-04-24 DIAGNOSIS — Q63.2 ECTOPIC KIDNEY: ICD-10-CM

## 2023-04-24 DIAGNOSIS — Z01.818 ENCOUNTER FOR OTHER PREPROCEDURAL EXAMINATION: ICD-10-CM

## 2023-04-24 DIAGNOSIS — Z98.890 OTHER SPECIFIED POSTPROCEDURAL STATES: Chronic | ICD-10-CM

## 2023-04-24 DIAGNOSIS — N20.0 CALCULUS OF KIDNEY: ICD-10-CM

## 2023-04-24 LAB
ANION GAP SERPL CALC-SCNC: 8 MMOL/L — SIGNIFICANT CHANGE UP (ref 5–17)
BUN SERPL-MCNC: 17 MG/DL — SIGNIFICANT CHANGE UP (ref 7–23)
CALCIUM SERPL-MCNC: 9.6 MG/DL — SIGNIFICANT CHANGE UP (ref 8.4–10.5)
CHLORIDE SERPL-SCNC: 104 MMOL/L — SIGNIFICANT CHANGE UP (ref 96–108)
CO2 SERPL-SCNC: 27 MMOL/L — SIGNIFICANT CHANGE UP (ref 22–31)
CREAT SERPL-MCNC: 1.08 MG/DL — SIGNIFICANT CHANGE UP (ref 0.5–1.3)
EGFR: 77 ML/MIN/1.73M2 — SIGNIFICANT CHANGE UP
GLUCOSE SERPL-MCNC: 104 MG/DL — HIGH (ref 70–99)
HCT VFR BLD CALC: 42.1 % — SIGNIFICANT CHANGE UP (ref 39–50)
HGB BLD-MCNC: 14.2 G/DL — SIGNIFICANT CHANGE UP (ref 13–17)
MCHC RBC-ENTMCNC: 30.2 PG — SIGNIFICANT CHANGE UP (ref 27–34)
MCHC RBC-ENTMCNC: 33.7 GM/DL — SIGNIFICANT CHANGE UP (ref 32–36)
MCV RBC AUTO: 89.6 FL — SIGNIFICANT CHANGE UP (ref 80–100)
NRBC # BLD: 0 /100 WBCS — SIGNIFICANT CHANGE UP (ref 0–0)
PLATELET # BLD AUTO: 195 K/UL — SIGNIFICANT CHANGE UP (ref 150–400)
POTASSIUM SERPL-MCNC: 4.3 MMOL/L — SIGNIFICANT CHANGE UP (ref 3.5–5.3)
POTASSIUM SERPL-SCNC: 4.3 MMOL/L — SIGNIFICANT CHANGE UP (ref 3.5–5.3)
RBC # BLD: 4.7 M/UL — SIGNIFICANT CHANGE UP (ref 4.2–5.8)
RBC # FLD: 12.7 % — SIGNIFICANT CHANGE UP (ref 10.3–14.5)
SODIUM SERPL-SCNC: 139 MMOL/L — SIGNIFICANT CHANGE UP (ref 135–145)
WBC # BLD: 5.75 K/UL — SIGNIFICANT CHANGE UP (ref 3.8–10.5)
WBC # FLD AUTO: 5.75 K/UL — SIGNIFICANT CHANGE UP (ref 3.8–10.5)

## 2023-04-24 PROCEDURE — 87086 URINE CULTURE/COLONY COUNT: CPT

## 2023-04-24 PROCEDURE — 80048 BASIC METABOLIC PNL TOTAL CA: CPT

## 2023-04-24 PROCEDURE — G0463: CPT

## 2023-04-24 PROCEDURE — 85027 COMPLETE CBC AUTOMATED: CPT

## 2023-04-24 RX ORDER — CIPROFLOXACIN LACTATE 400MG/40ML
400 VIAL (ML) INTRAVENOUS ONCE
Refills: 0 | Status: DISCONTINUED | OUTPATIENT
Start: 2023-05-15 | End: 2023-05-18

## 2023-04-24 NOTE — H&P PST ADULT - ATTENDING COMMENTS
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

## 2023-04-24 NOTE — H&P PST ADULT - HISTORY OF PRESENT ILLNESS
This is a 63 y/o male PMH HLD, microscopic hematuria, nephrolithiasis, denies dysuria or flank pain.  Presents today for cystoscopy, left retrograde and left percutaneous stone removal.    No recent H/O COVID infection

## 2023-04-25 LAB
CULTURE RESULTS: SIGNIFICANT CHANGE UP
SPECIMEN SOURCE: SIGNIFICANT CHANGE UP

## 2023-04-27 ENCOUNTER — APPOINTMENT (OUTPATIENT)
Dept: CARDIOLOGY | Facility: CLINIC | Age: 63
End: 2023-04-27
Payer: COMMERCIAL

## 2023-04-27 ENCOUNTER — NON-APPOINTMENT (OUTPATIENT)
Age: 63
End: 2023-04-27

## 2023-04-27 VITALS
TEMPERATURE: 97.9 F | OXYGEN SATURATION: 97 % | HEIGHT: 69 IN | HEART RATE: 76 BPM | SYSTOLIC BLOOD PRESSURE: 134 MMHG | BODY MASS INDEX: 24.73 KG/M2 | WEIGHT: 167 LBS | DIASTOLIC BLOOD PRESSURE: 81 MMHG

## 2023-04-27 PROCEDURE — 99214 OFFICE O/P EST MOD 30 MIN: CPT

## 2023-04-27 PROCEDURE — 93000 ELECTROCARDIOGRAM COMPLETE: CPT

## 2023-04-27 RX ORDER — ROSUVASTATIN CALCIUM 5 MG/1
5 TABLET, FILM COATED ORAL DAILY
Qty: 45 | Refills: 3 | Status: ACTIVE | COMMUNITY
Start: 2018-01-24 | End: 1900-01-01

## 2023-04-30 NOTE — DISCUSSION/SUMMARY
[FreeTextEntry1] : 61 y/o man with borderline lipids on Crestor 2.5mg, recent cardiac CT with minimal LAD disease planning on open kidney stone removal.\par \par He has no cardiac contraindications in proceeding with the planned procedure and is at an acceptable risk. No further testing is required.\par \par  [EKG obtained to assist in diagnosis and management of assessed problem(s)] : EKG obtained to assist in diagnosis and management of assessed problem(s)

## 2023-04-30 NOTE — HISTORY OF PRESENT ILLNESS
[FreeTextEntry1] : Doing well overall.\par No complaints\par No CP\par No SOB \par No Le edema\par No palps \par Undergoing kidney stone removal (open)

## 2023-05-14 ENCOUNTER — TRANSCRIPTION ENCOUNTER (OUTPATIENT)
Age: 63
End: 2023-05-14

## 2023-05-15 ENCOUNTER — INPATIENT (INPATIENT)
Facility: HOSPITAL | Age: 63
LOS: 2 days | Discharge: HOME | DRG: 661 | End: 2023-05-18
Attending: UROLOGY | Admitting: UROLOGY
Payer: COMMERCIAL

## 2023-05-15 ENCOUNTER — APPOINTMENT (OUTPATIENT)
Dept: UROLOGY | Facility: HOSPITAL | Age: 63
End: 2023-05-15

## 2023-05-15 ENCOUNTER — RESULT REVIEW (OUTPATIENT)
Age: 63
End: 2023-05-15

## 2023-05-15 VITALS
TEMPERATURE: 98 F | RESPIRATION RATE: 14 BRPM | OXYGEN SATURATION: 99 % | HEART RATE: 72 BPM | WEIGHT: 167.99 LBS | SYSTOLIC BLOOD PRESSURE: 132 MMHG | HEIGHT: 69 IN | DIASTOLIC BLOOD PRESSURE: 73 MMHG

## 2023-05-15 DIAGNOSIS — Q63.2 ECTOPIC KIDNEY: ICD-10-CM

## 2023-05-15 DIAGNOSIS — N20.0 CALCULUS OF KIDNEY: ICD-10-CM

## 2023-05-15 DIAGNOSIS — Z98.890 OTHER SPECIFIED POSTPROCEDURAL STATES: Chronic | ICD-10-CM

## 2023-05-15 LAB
ANION GAP SERPL CALC-SCNC: 11 MMOL/L — SIGNIFICANT CHANGE UP (ref 5–17)
BASOPHILS # BLD AUTO: 0.04 K/UL — SIGNIFICANT CHANGE UP (ref 0–0.2)
BASOPHILS NFR BLD AUTO: 0.5 % — SIGNIFICANT CHANGE UP (ref 0–2)
BLD GP AB SCN SERPL QL: NEGATIVE — SIGNIFICANT CHANGE UP
BUN SERPL-MCNC: 17 MG/DL — SIGNIFICANT CHANGE UP (ref 7–23)
CALCIUM SERPL-MCNC: 8.6 MG/DL — SIGNIFICANT CHANGE UP (ref 8.4–10.5)
CHLORIDE SERPL-SCNC: 104 MMOL/L — SIGNIFICANT CHANGE UP (ref 96–108)
CO2 SERPL-SCNC: 24 MMOL/L — SIGNIFICANT CHANGE UP (ref 22–31)
CREAT SERPL-MCNC: 1.08 MG/DL — SIGNIFICANT CHANGE UP (ref 0.5–1.3)
EGFR: 77 ML/MIN/1.73M2 — SIGNIFICANT CHANGE UP
EOSINOPHIL # BLD AUTO: 0.09 K/UL — SIGNIFICANT CHANGE UP (ref 0–0.5)
EOSINOPHIL NFR BLD AUTO: 1.2 % — SIGNIFICANT CHANGE UP (ref 0–6)
GLUCOSE SERPL-MCNC: 99 MG/DL — SIGNIFICANT CHANGE UP (ref 70–99)
HCT VFR BLD CALC: 37.9 % — LOW (ref 39–50)
HGB BLD-MCNC: 13 G/DL — SIGNIFICANT CHANGE UP (ref 13–17)
IMM GRANULOCYTES NFR BLD AUTO: 0.8 % — SIGNIFICANT CHANGE UP (ref 0–0.9)
LYMPHOCYTES # BLD AUTO: 2.35 K/UL — SIGNIFICANT CHANGE UP (ref 1–3.3)
LYMPHOCYTES # BLD AUTO: 31.8 % — SIGNIFICANT CHANGE UP (ref 13–44)
MCHC RBC-ENTMCNC: 30.2 PG — SIGNIFICANT CHANGE UP (ref 27–34)
MCHC RBC-ENTMCNC: 34.3 GM/DL — SIGNIFICANT CHANGE UP (ref 32–36)
MCV RBC AUTO: 88.1 FL — SIGNIFICANT CHANGE UP (ref 80–100)
MONOCYTES # BLD AUTO: 0.8 K/UL — SIGNIFICANT CHANGE UP (ref 0–0.9)
MONOCYTES NFR BLD AUTO: 10.8 % — SIGNIFICANT CHANGE UP (ref 2–14)
NEUTROPHILS # BLD AUTO: 4.04 K/UL — SIGNIFICANT CHANGE UP (ref 1.8–7.4)
NEUTROPHILS NFR BLD AUTO: 54.9 % — SIGNIFICANT CHANGE UP (ref 43–77)
NRBC # BLD: 0 /100 WBCS — SIGNIFICANT CHANGE UP (ref 0–0)
PLATELET # BLD AUTO: 186 K/UL — SIGNIFICANT CHANGE UP (ref 150–400)
POTASSIUM SERPL-MCNC: 3.8 MMOL/L — SIGNIFICANT CHANGE UP (ref 3.5–5.3)
POTASSIUM SERPL-SCNC: 3.8 MMOL/L — SIGNIFICANT CHANGE UP (ref 3.5–5.3)
RBC # BLD: 4.3 M/UL — SIGNIFICANT CHANGE UP (ref 4.2–5.8)
RBC # FLD: 13 % — SIGNIFICANT CHANGE UP (ref 10.3–14.5)
RH IG SCN BLD-IMP: NEGATIVE — SIGNIFICANT CHANGE UP
SODIUM SERPL-SCNC: 139 MMOL/L — SIGNIFICANT CHANGE UP (ref 135–145)
WBC # BLD: 7.38 K/UL — SIGNIFICANT CHANGE UP (ref 3.8–10.5)
WBC # FLD AUTO: 7.38 K/UL — SIGNIFICANT CHANGE UP (ref 3.8–10.5)

## 2023-05-15 PROCEDURE — 50437 DILAT XST TRC NEW ACCESS RCS: CPT | Mod: LT,59

## 2023-05-15 PROCEDURE — 50433 PLMT NEPHROURETERAL CATHETER: CPT | Mod: LT,59

## 2023-05-15 PROCEDURE — 50081 PERQ NL/PL LITHOTRP CPLX>2CM: CPT | Mod: LT

## 2023-05-15 PROCEDURE — 88300 SURGICAL PATH GROSS: CPT | Mod: 26

## 2023-05-15 PROCEDURE — 71045 X-RAY EXAM CHEST 1 VIEW: CPT | Mod: 26

## 2023-05-15 DEVICE — TRILOGY PROBE KIT 3.9MM X 440MM (BLUE): Type: IMPLANTABLE DEVICE | Status: FUNCTIONAL

## 2023-05-15 DEVICE — STONE BASKET ZEROTIP NITINOL 4-WIRE 1.9FR 120CM X 12MM: Type: IMPLANTABLE DEVICE | Status: FUNCTIONAL

## 2023-05-15 DEVICE — URETERAL CATH IMAGER II BERN 5FR 65CM: Type: IMPLANTABLE DEVICE | Status: FUNCTIONAL

## 2023-05-15 DEVICE — GUIDEWIRE AMPLATZ SUPER-STIFF STRAIGHT .035" X 145CM: Type: IMPLANTABLE DEVICE | Status: FUNCTIONAL

## 2023-05-15 DEVICE — GUIDEWIRE SENSOR DUAL-FLEX NITINOL STRAIGHT .035" X 150CM: Type: IMPLANTABLE DEVICE | Status: FUNCTIONAL

## 2023-05-15 DEVICE — URETERAL CATH AXXCESS OPEN END 6FR 70CM: Type: IMPLANTABLE DEVICE | Status: FUNCTIONAL

## 2023-05-15 RX ORDER — SODIUM CHLORIDE 9 MG/ML
1000 INJECTION, SOLUTION INTRAVENOUS
Refills: 0 | Status: DISCONTINUED | OUTPATIENT
Start: 2023-05-15 | End: 2023-05-16

## 2023-05-15 RX ORDER — SENNA PLUS 8.6 MG/1
2 TABLET ORAL AT BEDTIME
Refills: 0 | Status: DISCONTINUED | OUTPATIENT
Start: 2023-05-15 | End: 2023-05-17

## 2023-05-15 RX ORDER — LIDOCAINE HCL 20 MG/ML
0.2 VIAL (ML) INJECTION ONCE
Refills: 0 | Status: DISCONTINUED | OUTPATIENT
Start: 2023-05-15 | End: 2023-05-15

## 2023-05-15 RX ORDER — HEPARIN SODIUM 5000 [USP'U]/ML
5000 INJECTION INTRAVENOUS; SUBCUTANEOUS EVERY 8 HOURS
Refills: 0 | Status: DISCONTINUED | OUTPATIENT
Start: 2023-05-15 | End: 2023-05-18

## 2023-05-15 RX ORDER — CHLORHEXIDINE GLUCONATE 213 G/1000ML
1 SOLUTION TOPICAL ONCE
Refills: 0 | Status: DISCONTINUED | OUTPATIENT
Start: 2023-05-15 | End: 2023-05-15

## 2023-05-15 RX ORDER — OXYCODONE HYDROCHLORIDE 5 MG/1
10 TABLET ORAL EVERY 6 HOURS
Refills: 0 | Status: DISCONTINUED | OUTPATIENT
Start: 2023-05-15 | End: 2023-05-17

## 2023-05-15 RX ORDER — CIPROFLOXACIN LACTATE 400MG/40ML
400 VIAL (ML) INTRAVENOUS EVERY 12 HOURS
Refills: 0 | Status: DISCONTINUED | OUTPATIENT
Start: 2023-05-15 | End: 2023-05-18

## 2023-05-15 RX ORDER — SODIUM CHLORIDE 9 MG/ML
3 INJECTION INTRAMUSCULAR; INTRAVENOUS; SUBCUTANEOUS EVERY 8 HOURS
Refills: 0 | Status: DISCONTINUED | OUTPATIENT
Start: 2023-05-15 | End: 2023-05-15

## 2023-05-15 RX ORDER — ONDANSETRON 8 MG/1
4 TABLET, FILM COATED ORAL EVERY 6 HOURS
Refills: 0 | Status: DISCONTINUED | OUTPATIENT
Start: 2023-05-15 | End: 2023-05-18

## 2023-05-15 RX ORDER — ACETAMINOPHEN 500 MG
650 TABLET ORAL EVERY 6 HOURS
Refills: 0 | Status: DISCONTINUED | OUTPATIENT
Start: 2023-05-15 | End: 2023-05-17

## 2023-05-15 RX ORDER — ONDANSETRON 8 MG/1
4 TABLET, FILM COATED ORAL ONCE
Refills: 0 | Status: DISCONTINUED | OUTPATIENT
Start: 2023-05-15 | End: 2023-05-15

## 2023-05-15 RX ORDER — HYDROMORPHONE HYDROCHLORIDE 2 MG/ML
0.5 INJECTION INTRAMUSCULAR; INTRAVENOUS; SUBCUTANEOUS
Refills: 0 | Status: DISCONTINUED | OUTPATIENT
Start: 2023-05-15 | End: 2023-05-15

## 2023-05-15 RX ORDER — OXYCODONE HYDROCHLORIDE 5 MG/1
5 TABLET ORAL EVERY 6 HOURS
Refills: 0 | Status: DISCONTINUED | OUTPATIENT
Start: 2023-05-15 | End: 2023-05-17

## 2023-05-15 RX ADMIN — Medication 200 MILLIGRAM(S): at 17:12

## 2023-05-15 RX ADMIN — SODIUM CHLORIDE 3 MILLILITER(S): 9 INJECTION INTRAMUSCULAR; INTRAVENOUS; SUBCUTANEOUS at 08:44

## 2023-05-15 RX ADMIN — HEPARIN SODIUM 5000 UNIT(S): 5000 INJECTION INTRAVENOUS; SUBCUTANEOUS at 18:05

## 2023-05-15 RX ADMIN — OXYCODONE HYDROCHLORIDE 5 MILLIGRAM(S): 5 TABLET ORAL at 18:02

## 2023-05-15 RX ADMIN — SENNA PLUS 2 TABLET(S): 8.6 TABLET ORAL at 18:02

## 2023-05-15 NOTE — PATIENT PROFILE ADULT - FALL HARM RISK - RISK INTERVENTIONS
Assistance OOB with selected safe patient handling equipment/Assistance with ambulation/Communicate Fall Risk and Risk Factors to all staff, patient, and family/Monitor gait and stability/Reinforce activity limits and safety measures with patient and family/Sit up slowly, dangle for a short time, stand at bedside before walking/Use of alarms - bed, chair and/or voice tab/Visual Cue: Yellow wristband/Bed in lowest position, wheels locked, appropriate side rails in place/Call bell, personal items and telephone in reach/Instruct patient to call for assistance before getting out of bed or chair/Non-slip footwear when patient is out of bed/[place] to call system/Physically safe environment - no spills, clutter or unnecessary equipment/Purposeful Proactive Rounding/Room/bathroom lighting operational, light cord in reach

## 2023-05-15 NOTE — BRIEF OPERATIVE NOTE - ASSISTANT(S)
History  Chief Complaint   Patient presents with    Oral Swelling     pain and swelling on R lower gum has false teeth so states it is not his teeth  hard to eat     Patient presents for evaluation of mouth pain and swelling for the las 1-2 days  Unknown injury  Patient has dentures  History provided by:  Patient   used: No        Prior to Admission Medications   Prescriptions Last Dose Informant Patient Reported? Taking? ARIPiprazole (ABILIFY) 9 75 mg/1 3 mL injection   Yes Yes   Sig: Inject 4 mg into a muscle every 30 (thirty) days   OLANZapine (ZyPREXA) 10 mg tablet   Yes Yes   Sig: Take 10 mg by mouth 2 (two) times a day   traZODone (DESYREL) 150 mg tablet   Yes Yes   Sig: Take 50 mg by mouth daily at bedtime      Facility-Administered Medications: None       Past Medical History:   Diagnosis Date    Psychiatric disorder        Past Surgical History:   Procedure Laterality Date    JOINT REPLACEMENT         History reviewed  No pertinent family history  I have reviewed and agree with the history as documented  Social History   Substance Use Topics    Smoking status: Current Every Day Smoker     Packs/day: 0 50    Smokeless tobacco: Never Used    Alcohol use No        Review of Systems   All other systems reviewed and are negative  Physical Exam  Physical Exam   Constitutional: He is oriented to person, place, and time  No distress  HENT:   Mouth/Throat:       Neck: Normal range of motion  Neck supple  Cardiovascular: Normal rate, regular rhythm and intact distal pulses  Pulmonary/Chest: Effort normal and breath sounds normal  No respiratory distress  Neurological: He is alert and oriented to person, place, and time  Skin: Capillary refill takes less than 2 seconds  He is not diaphoretic  Nursing note and vitals reviewed        Vital Signs  ED Triage Vitals [01/09/19 2002]   Temperature Pulse Respirations Blood Pressure SpO2   98 5 °F (36 9 °C) (!) 108 20 101/50 97 %      Temp Source Heart Rate Source Patient Position - Orthostatic VS BP Location FiO2 (%)   Tympanic Monitor Sitting Right arm --      Pain Score       8           Vitals:    01/09/19 2002   BP: 101/50   Pulse: (!) 108   Patient Position - Orthostatic VS: Sitting       Visual Acuity      ED Medications  Medications   penicillin V potassium (VEETID) tablet 500 mg (500 mg Oral Given 1/9/19 2019)       Diagnostic Studies  Results Reviewed     None                 No orders to display              Procedures  Procedures       Phone Contacts  ED Phone Contact    ED Course                               MDM  Number of Diagnoses or Management Options  Oral mucosal lesion:   Diagnosis management comments: Pulse ox 97% on RA indicating adequate oxygenation    Recommended keeping the dentures out as they are position to rub against the area and to follow up with dentist/oral surgery for further evaluation  Amount and/or Complexity of Data Reviewed  Decide to obtain previous medical records or to obtain history from someone other than the patient: yes  Review and summarize past medical records: yes    Patient Progress  Patient progress: stable    CritCare Time    Disposition  Final diagnoses:   Oral mucosal lesion     Time reflects when diagnosis was documented in both MDM as applicable and the Disposition within this note     Time User Action Codes Description Comment    1/9/2019  8:16 PM Carolina Prom Add [K13 70] Oral mucosal lesion       ED Disposition     ED Disposition Condition Comment    Discharge  Kelin Meth discharge to home/self care      Condition at discharge: stable        Follow-up Information     Follow up With Specialties Details Why [address], DMD Oral Maxillofacial Surgery In 5 days  [address]  [place] [address] [facility]  [phone]            Discharge Medication List as of 1/9/2019  8:18 PM      START taking these medications    Details chlorhexidine (PERIDEX) 0 12 % solution Apply 15 mL to the mouth or throat 2 (two) times a day, Starting Wed 1/9/2019, Print      penicillin V potassium (VEETID) 500 mg tablet Take 1 tablet (500 mg total) by mouth 3 (three) times a day for 7 days, Starting Wed 1/9/2019, Until Wed 1/16/2019, Print         CONTINUE these medications which have NOT CHANGED    Details   ARIPiprazole (ABILIFY) 9 75 mg/1 3 mL injection Inject 4 mg into a muscle every 30 (thirty) days, Historical Med      OLANZapine (ZyPREXA) 10 mg tablet Take 10 mg by mouth 2 (two) times a day, Historical Med      traZODone (DESYREL) 150 mg tablet Take 50 mg by mouth daily at bedtime, Historical Med           No discharge procedures on file      ED Provider  Electronically Signed by           Shailesh Velazquez DO  01/10/19 2048 [place]air

## 2023-05-15 NOTE — BRIEF OPERATIVE NOTE - NSICDXBRIEFPROCEDURE_GEN_ALL_CORE_FT
PROCEDURES:  Percutaneous nephrostolithotomy with lithotripsy of large calculus 15-May-2023 12:29:32  Kehinde Dela Cruz

## 2023-05-16 LAB
ANION GAP SERPL CALC-SCNC: 10 MMOL/L — SIGNIFICANT CHANGE UP (ref 5–17)
ANION GAP SERPL CALC-SCNC: 13 MMOL/L — SIGNIFICANT CHANGE UP (ref 5–17)
BASOPHILS # BLD AUTO: 0.01 K/UL — SIGNIFICANT CHANGE UP (ref 0–0.2)
BASOPHILS NFR BLD AUTO: 0.2 % — SIGNIFICANT CHANGE UP (ref 0–2)
BUN SERPL-MCNC: 10 MG/DL — SIGNIFICANT CHANGE UP (ref 7–23)
BUN SERPL-MCNC: 12 MG/DL — SIGNIFICANT CHANGE UP (ref 7–23)
CALCIUM SERPL-MCNC: 7.3 MG/DL — LOW (ref 8.4–10.5)
CALCIUM SERPL-MCNC: 9.1 MG/DL — SIGNIFICANT CHANGE UP (ref 8.4–10.5)
CHLORIDE SERPL-SCNC: 100 MMOL/L — SIGNIFICANT CHANGE UP (ref 96–108)
CHLORIDE SERPL-SCNC: 86 MMOL/L — LOW (ref 96–108)
CO2 SERPL-SCNC: 19 MMOL/L — LOW (ref 22–31)
CO2 SERPL-SCNC: 23 MMOL/L — SIGNIFICANT CHANGE UP (ref 22–31)
CREAT SERPL-MCNC: 0.91 MG/DL — SIGNIFICANT CHANGE UP (ref 0.5–1.3)
CREAT SERPL-MCNC: 1.04 MG/DL — SIGNIFICANT CHANGE UP (ref 0.5–1.3)
EGFR: 81 ML/MIN/1.73M2 — SIGNIFICANT CHANGE UP
EGFR: 95 ML/MIN/1.73M2 — SIGNIFICANT CHANGE UP
EOSINOPHIL # BLD AUTO: 0.02 K/UL — SIGNIFICANT CHANGE UP (ref 0–0.5)
EOSINOPHIL NFR BLD AUTO: 0.3 % — SIGNIFICANT CHANGE UP (ref 0–6)
GLUCOSE SERPL-MCNC: 178 MG/DL — HIGH (ref 70–99)
GLUCOSE SERPL-MCNC: 673 MG/DL — CRITICAL HIGH (ref 70–99)
HCT VFR BLD CALC: 26.8 % — LOW (ref 39–50)
HCT VFR BLD CALC: 39.8 % — SIGNIFICANT CHANGE UP (ref 39–50)
HGB BLD-MCNC: 13.9 G/DL — SIGNIFICANT CHANGE UP (ref 13–17)
HGB BLD-MCNC: 8.5 G/DL — LOW (ref 13–17)
IMM GRANULOCYTES NFR BLD AUTO: 0.7 % — SIGNIFICANT CHANGE UP (ref 0–0.9)
LYMPHOCYTES # BLD AUTO: 0.67 K/UL — LOW (ref 1–3.3)
LYMPHOCYTES # BLD AUTO: 11 % — LOW (ref 13–44)
MCHC RBC-ENTMCNC: 30.4 PG — SIGNIFICANT CHANGE UP (ref 27–34)
MCHC RBC-ENTMCNC: 30.4 PG — SIGNIFICANT CHANGE UP (ref 27–34)
MCHC RBC-ENTMCNC: 31.7 GM/DL — LOW (ref 32–36)
MCHC RBC-ENTMCNC: 34.9 GM/DL — SIGNIFICANT CHANGE UP (ref 32–36)
MCV RBC AUTO: 87.1 FL — SIGNIFICANT CHANGE UP (ref 80–100)
MCV RBC AUTO: 95.7 FL — SIGNIFICANT CHANGE UP (ref 80–100)
MONOCYTES # BLD AUTO: 0.78 K/UL — SIGNIFICANT CHANGE UP (ref 0–0.9)
MONOCYTES NFR BLD AUTO: 12.8 % — SIGNIFICANT CHANGE UP (ref 2–14)
NEUTROPHILS # BLD AUTO: 4.56 K/UL — SIGNIFICANT CHANGE UP (ref 1.8–7.4)
NEUTROPHILS NFR BLD AUTO: 75 % — SIGNIFICANT CHANGE UP (ref 43–77)
NRBC # BLD: 0 /100 WBCS — SIGNIFICANT CHANGE UP (ref 0–0)
NRBC # BLD: 0 /100 WBCS — SIGNIFICANT CHANGE UP (ref 0–0)
PLATELET # BLD AUTO: 105 K/UL — LOW (ref 150–400)
PLATELET # BLD AUTO: 196 K/UL — SIGNIFICANT CHANGE UP (ref 150–400)
POTASSIUM SERPL-MCNC: 3.5 MMOL/L — SIGNIFICANT CHANGE UP (ref 3.5–5.3)
POTASSIUM SERPL-MCNC: 3.8 MMOL/L — SIGNIFICANT CHANGE UP (ref 3.5–5.3)
POTASSIUM SERPL-SCNC: 3.5 MMOL/L — SIGNIFICANT CHANGE UP (ref 3.5–5.3)
POTASSIUM SERPL-SCNC: 3.8 MMOL/L — SIGNIFICANT CHANGE UP (ref 3.5–5.3)
RBC # BLD: 2.8 M/UL — LOW (ref 4.2–5.8)
RBC # BLD: 4.57 M/UL — SIGNIFICANT CHANGE UP (ref 4.2–5.8)
RBC # FLD: 12.9 % — SIGNIFICANT CHANGE UP (ref 10.3–14.5)
RBC # FLD: 13.3 % — SIGNIFICANT CHANGE UP (ref 10.3–14.5)
SODIUM SERPL-SCNC: 118 MMOL/L — CRITICAL LOW (ref 135–145)
SODIUM SERPL-SCNC: 133 MMOL/L — LOW (ref 135–145)
WBC # BLD: 10.53 K/UL — HIGH (ref 3.8–10.5)
WBC # BLD: 6.08 K/UL — SIGNIFICANT CHANGE UP (ref 3.8–10.5)
WBC # FLD AUTO: 10.53 K/UL — HIGH (ref 3.8–10.5)
WBC # FLD AUTO: 6.08 K/UL — SIGNIFICANT CHANGE UP (ref 3.8–10.5)

## 2023-05-16 RX ORDER — SODIUM CHLORIDE 9 MG/ML
1000 INJECTION, SOLUTION INTRAVENOUS
Refills: 0 | Status: DISCONTINUED | OUTPATIENT
Start: 2023-05-16 | End: 2023-05-17

## 2023-05-16 RX ORDER — SIMETHICONE 80 MG/1
80 TABLET, CHEWABLE ORAL
Refills: 0 | Status: DISCONTINUED | OUTPATIENT
Start: 2023-05-16 | End: 2023-05-18

## 2023-05-16 RX ORDER — POLYETHYLENE GLYCOL 3350 17 G/17G
17 POWDER, FOR SOLUTION ORAL DAILY
Refills: 0 | Status: DISCONTINUED | OUTPATIENT
Start: 2023-05-16 | End: 2023-05-18

## 2023-05-16 RX ADMIN — OXYCODONE HYDROCHLORIDE 10 MILLIGRAM(S): 5 TABLET ORAL at 00:34

## 2023-05-16 RX ADMIN — Medication 650 MILLIGRAM(S): at 08:28

## 2023-05-16 RX ADMIN — HEPARIN SODIUM 5000 UNIT(S): 5000 INJECTION INTRAVENOUS; SUBCUTANEOUS at 02:30

## 2023-05-16 RX ADMIN — OXYCODONE HYDROCHLORIDE 10 MILLIGRAM(S): 5 TABLET ORAL at 00:04

## 2023-05-16 RX ADMIN — Medication 200 MILLIGRAM(S): at 17:26

## 2023-05-16 RX ADMIN — SODIUM CHLORIDE 75 MILLILITER(S): 9 INJECTION, SOLUTION INTRAVENOUS at 12:38

## 2023-05-16 RX ADMIN — Medication 650 MILLIGRAM(S): at 18:58

## 2023-05-16 RX ADMIN — POLYETHYLENE GLYCOL 3350 17 GRAM(S): 17 POWDER, FOR SOLUTION ORAL at 17:17

## 2023-05-16 RX ADMIN — HEPARIN SODIUM 5000 UNIT(S): 5000 INJECTION INTRAVENOUS; SUBCUTANEOUS at 11:39

## 2023-05-16 RX ADMIN — SENNA PLUS 2 TABLET(S): 8.6 TABLET ORAL at 08:28

## 2023-05-16 RX ADMIN — SIMETHICONE 80 MILLIGRAM(S): 80 TABLET, CHEWABLE ORAL at 14:16

## 2023-05-16 RX ADMIN — Medication 200 MILLIGRAM(S): at 05:05

## 2023-05-16 RX ADMIN — Medication 650 MILLIGRAM(S): at 09:28

## 2023-05-17 LAB
ANION GAP SERPL CALC-SCNC: 10 MMOL/L — SIGNIFICANT CHANGE UP (ref 5–17)
BUN SERPL-MCNC: 9 MG/DL — SIGNIFICANT CHANGE UP (ref 7–23)
CALCIUM SERPL-MCNC: 9.4 MG/DL — SIGNIFICANT CHANGE UP (ref 8.4–10.5)
CHLORIDE SERPL-SCNC: 103 MMOL/L — SIGNIFICANT CHANGE UP (ref 96–108)
CO2 SERPL-SCNC: 26 MMOL/L — SIGNIFICANT CHANGE UP (ref 22–31)
CREAT SERPL-MCNC: 1.11 MG/DL — SIGNIFICANT CHANGE UP (ref 0.5–1.3)
CULTURE RESULTS: NO GROWTH — SIGNIFICANT CHANGE UP
CULTURE RESULTS: NO GROWTH — SIGNIFICANT CHANGE UP
CULTURE RESULTS: SIGNIFICANT CHANGE UP
EGFR: 75 ML/MIN/1.73M2 — SIGNIFICANT CHANGE UP
GLUCOSE SERPL-MCNC: 119 MG/DL — HIGH (ref 70–99)
HCT VFR BLD CALC: 38.4 % — LOW (ref 39–50)
HGB BLD-MCNC: 13.1 G/DL — SIGNIFICANT CHANGE UP (ref 13–17)
MCHC RBC-ENTMCNC: 30.1 PG — SIGNIFICANT CHANGE UP (ref 27–34)
MCHC RBC-ENTMCNC: 34.1 GM/DL — SIGNIFICANT CHANGE UP (ref 32–36)
MCV RBC AUTO: 88.3 FL — SIGNIFICANT CHANGE UP (ref 80–100)
NRBC # BLD: 0 /100 WBCS — SIGNIFICANT CHANGE UP (ref 0–0)
PLATELET # BLD AUTO: 172 K/UL — SIGNIFICANT CHANGE UP (ref 150–400)
POTASSIUM SERPL-MCNC: 3.9 MMOL/L — SIGNIFICANT CHANGE UP (ref 3.5–5.3)
POTASSIUM SERPL-SCNC: 3.9 MMOL/L — SIGNIFICANT CHANGE UP (ref 3.5–5.3)
RBC # BLD: 4.35 M/UL — SIGNIFICANT CHANGE UP (ref 4.2–5.8)
RBC # FLD: 13.1 % — SIGNIFICANT CHANGE UP (ref 10.3–14.5)
SODIUM SERPL-SCNC: 139 MMOL/L — SIGNIFICANT CHANGE UP (ref 135–145)
SPECIMEN SOURCE: SIGNIFICANT CHANGE UP
SURGICAL PATHOLOGY STUDY: SIGNIFICANT CHANGE UP
WBC # BLD: 7.56 K/UL — SIGNIFICANT CHANGE UP (ref 3.8–10.5)
WBC # FLD AUTO: 7.56 K/UL — SIGNIFICANT CHANGE UP (ref 3.8–10.5)

## 2023-05-17 RX ORDER — MORPHINE SULFATE 50 MG/1
4 CAPSULE, EXTENDED RELEASE ORAL ONCE
Refills: 0 | Status: DISCONTINUED | OUTPATIENT
Start: 2023-05-17 | End: 2023-05-17

## 2023-05-17 RX ORDER — OXYCODONE HYDROCHLORIDE 5 MG/1
5 TABLET ORAL EVERY 4 HOURS
Refills: 0 | Status: DISCONTINUED | OUTPATIENT
Start: 2023-05-17 | End: 2023-05-18

## 2023-05-17 RX ORDER — KETOROLAC TROMETHAMINE 30 MG/ML
15 SYRINGE (ML) INJECTION EVERY 8 HOURS
Refills: 0 | Status: DISCONTINUED | OUTPATIENT
Start: 2023-05-17 | End: 2023-05-18

## 2023-05-17 RX ORDER — PHENAZOPYRIDINE HCL 100 MG
100 TABLET ORAL EVERY 8 HOURS
Refills: 0 | Status: DISCONTINUED | OUTPATIENT
Start: 2023-05-17 | End: 2023-05-18

## 2023-05-17 RX ORDER — TAMSULOSIN HYDROCHLORIDE 0.4 MG/1
0.4 CAPSULE ORAL AT BEDTIME
Refills: 0 | Status: DISCONTINUED | OUTPATIENT
Start: 2023-05-17 | End: 2023-05-18

## 2023-05-17 RX ORDER — CYCLOBENZAPRINE HYDROCHLORIDE 10 MG/1
5 TABLET, FILM COATED ORAL
Refills: 0 | Status: DISCONTINUED | OUTPATIENT
Start: 2023-05-17 | End: 2023-05-17

## 2023-05-17 RX ORDER — MORPHINE SULFATE 50 MG/1
4 CAPSULE, EXTENDED RELEASE ORAL DAILY
Refills: 0 | Status: DISCONTINUED | OUTPATIENT
Start: 2023-05-17 | End: 2023-05-17

## 2023-05-17 RX ORDER — PANTOPRAZOLE SODIUM 20 MG/1
40 TABLET, DELAYED RELEASE ORAL
Refills: 0 | Status: DISCONTINUED | OUTPATIENT
Start: 2023-05-17 | End: 2023-05-18

## 2023-05-17 RX ORDER — KETOROLAC TROMETHAMINE 30 MG/ML
15 SYRINGE (ML) INJECTION ONCE
Refills: 0 | Status: DISCONTINUED | OUTPATIENT
Start: 2023-05-17 | End: 2023-05-17

## 2023-05-17 RX ORDER — CYCLOBENZAPRINE HYDROCHLORIDE 10 MG/1
5 TABLET, FILM COATED ORAL THREE TIMES A DAY
Refills: 0 | Status: DISCONTINUED | OUTPATIENT
Start: 2023-05-17 | End: 2023-05-18

## 2023-05-17 RX ORDER — SODIUM CHLORIDE 9 MG/ML
1000 INJECTION, SOLUTION INTRAVENOUS
Refills: 0 | Status: DISCONTINUED | OUTPATIENT
Start: 2023-05-17 | End: 2023-05-18

## 2023-05-17 RX ORDER — OXYCODONE HYDROCHLORIDE 5 MG/1
10 TABLET ORAL EVERY 4 HOURS
Refills: 0 | Status: DISCONTINUED | OUTPATIENT
Start: 2023-05-17 | End: 2023-05-18

## 2023-05-17 RX ORDER — ACETAMINOPHEN 500 MG
975 TABLET ORAL EVERY 6 HOURS
Refills: 0 | Status: DISCONTINUED | OUTPATIENT
Start: 2023-05-17 | End: 2023-05-18

## 2023-05-17 RX ORDER — ACETAMINOPHEN 500 MG
1000 TABLET ORAL ONCE
Refills: 0 | Status: COMPLETED | OUTPATIENT
Start: 2023-05-17 | End: 2023-05-17

## 2023-05-17 RX ORDER — SENNA PLUS 8.6 MG/1
2 TABLET ORAL AT BEDTIME
Refills: 0 | Status: DISCONTINUED | OUTPATIENT
Start: 2023-05-17 | End: 2023-05-18

## 2023-05-17 RX ADMIN — POLYETHYLENE GLYCOL 3350 17 GRAM(S): 17 POWDER, FOR SOLUTION ORAL at 12:48

## 2023-05-17 RX ADMIN — HEPARIN SODIUM 5000 UNIT(S): 5000 INJECTION INTRAVENOUS; SUBCUTANEOUS at 22:37

## 2023-05-17 RX ADMIN — Medication 650 MILLIGRAM(S): at 02:10

## 2023-05-17 RX ADMIN — Medication 100 MILLIGRAM(S): at 22:38

## 2023-05-17 RX ADMIN — MORPHINE SULFATE 4 MILLIGRAM(S): 50 CAPSULE, EXTENDED RELEASE ORAL at 07:36

## 2023-05-17 RX ADMIN — HEPARIN SODIUM 5000 UNIT(S): 5000 INJECTION INTRAVENOUS; SUBCUTANEOUS at 02:11

## 2023-05-17 RX ADMIN — Medication 200 MILLIGRAM(S): at 18:40

## 2023-05-17 RX ADMIN — Medication 15 MILLIGRAM(S): at 12:46

## 2023-05-17 RX ADMIN — Medication 1000 MILLIGRAM(S): at 08:15

## 2023-05-17 RX ADMIN — Medication 400 MILLIGRAM(S): at 07:47

## 2023-05-17 RX ADMIN — PANTOPRAZOLE SODIUM 40 MILLIGRAM(S): 20 TABLET, DELAYED RELEASE ORAL at 12:55

## 2023-05-17 RX ADMIN — MORPHINE SULFATE 4 MILLIGRAM(S): 50 CAPSULE, EXTENDED RELEASE ORAL at 06:48

## 2023-05-17 RX ADMIN — SENNA PLUS 2 TABLET(S): 8.6 TABLET ORAL at 22:38

## 2023-05-17 RX ADMIN — MORPHINE SULFATE 4 MILLIGRAM(S): 50 CAPSULE, EXTENDED RELEASE ORAL at 16:35

## 2023-05-17 RX ADMIN — Medication 650 MILLIGRAM(S): at 02:40

## 2023-05-17 RX ADMIN — MORPHINE SULFATE 4 MILLIGRAM(S): 50 CAPSULE, EXTENDED RELEASE ORAL at 16:05

## 2023-05-17 RX ADMIN — Medication 200 MILLIGRAM(S): at 05:05

## 2023-05-17 RX ADMIN — HEPARIN SODIUM 5000 UNIT(S): 5000 INJECTION INTRAVENOUS; SUBCUTANEOUS at 12:55

## 2023-05-17 RX ADMIN — SODIUM CHLORIDE 100 MILLILITER(S): 9 INJECTION, SOLUTION INTRAVENOUS at 09:00

## 2023-05-17 RX ADMIN — CYCLOBENZAPRINE HYDROCHLORIDE 5 MILLIGRAM(S): 10 TABLET, FILM COATED ORAL at 12:46

## 2023-05-17 RX ADMIN — Medication 650 MILLIGRAM(S): at 17:00

## 2023-05-17 RX ADMIN — Medication 15 MILLIGRAM(S): at 22:37

## 2023-05-17 RX ADMIN — Medication 15 MILLIGRAM(S): at 13:15

## 2023-05-17 RX ADMIN — Medication 650 MILLIGRAM(S): at 15:59

## 2023-05-17 RX ADMIN — TAMSULOSIN HYDROCHLORIDE 0.4 MILLIGRAM(S): 0.4 CAPSULE ORAL at 22:39

## 2023-05-18 ENCOUNTER — TRANSCRIPTION ENCOUNTER (OUTPATIENT)
Age: 63
End: 2023-05-18

## 2023-05-18 VITALS
DIASTOLIC BLOOD PRESSURE: 77 MMHG | RESPIRATION RATE: 18 BRPM | TEMPERATURE: 98 F | HEART RATE: 85 BPM | SYSTOLIC BLOOD PRESSURE: 125 MMHG | OXYGEN SATURATION: 95 %

## 2023-05-18 LAB
ANION GAP SERPL CALC-SCNC: 10 MMOL/L — SIGNIFICANT CHANGE UP (ref 5–17)
BUN SERPL-MCNC: 12 MG/DL — SIGNIFICANT CHANGE UP (ref 7–23)
CALCIUM SERPL-MCNC: 8.6 MG/DL — SIGNIFICANT CHANGE UP (ref 8.4–10.5)
CHLORIDE SERPL-SCNC: 98 MMOL/L — SIGNIFICANT CHANGE UP (ref 96–108)
CO2 SERPL-SCNC: 25 MMOL/L — SIGNIFICANT CHANGE UP (ref 22–31)
CREAT SERPL-MCNC: 1.75 MG/DL — HIGH (ref 0.5–1.3)
EGFR: 43 ML/MIN/1.73M2 — LOW
GLUCOSE SERPL-MCNC: 119 MG/DL — HIGH (ref 70–99)
HCT VFR BLD CALC: 36.1 % — LOW (ref 39–50)
HGB BLD-MCNC: 12.3 G/DL — LOW (ref 13–17)
MCHC RBC-ENTMCNC: 30.4 PG — SIGNIFICANT CHANGE UP (ref 27–34)
MCHC RBC-ENTMCNC: 34.1 GM/DL — SIGNIFICANT CHANGE UP (ref 32–36)
MCV RBC AUTO: 89.4 FL — SIGNIFICANT CHANGE UP (ref 80–100)
NRBC # BLD: 0 /100 WBCS — SIGNIFICANT CHANGE UP (ref 0–0)
PLATELET # BLD AUTO: 161 K/UL — SIGNIFICANT CHANGE UP (ref 150–400)
POTASSIUM SERPL-MCNC: 3.7 MMOL/L — SIGNIFICANT CHANGE UP (ref 3.5–5.3)
POTASSIUM SERPL-SCNC: 3.7 MMOL/L — SIGNIFICANT CHANGE UP (ref 3.5–5.3)
RBC # BLD: 4.04 M/UL — LOW (ref 4.2–5.8)
RBC # FLD: 13.2 % — SIGNIFICANT CHANGE UP (ref 10.3–14.5)
SODIUM SERPL-SCNC: 133 MMOL/L — LOW (ref 135–145)
WBC # BLD: 10.65 K/UL — HIGH (ref 3.8–10.5)
WBC # FLD AUTO: 10.65 K/UL — HIGH (ref 3.8–10.5)

## 2023-05-18 PROCEDURE — C1887: CPT

## 2023-05-18 PROCEDURE — 85027 COMPLETE CBC AUTOMATED: CPT

## 2023-05-18 PROCEDURE — 36415 COLL VENOUS BLD VENIPUNCTURE: CPT

## 2023-05-18 PROCEDURE — 82365 CALCULUS SPECTROSCOPY: CPT

## 2023-05-18 PROCEDURE — 86900 BLOOD TYPING SEROLOGIC ABO: CPT

## 2023-05-18 PROCEDURE — 86901 BLOOD TYPING SEROLOGIC RH(D): CPT

## 2023-05-18 PROCEDURE — 85025 COMPLETE CBC W/AUTO DIFF WBC: CPT

## 2023-05-18 PROCEDURE — 76000 FLUOROSCOPY <1 HR PHYS/QHP: CPT

## 2023-05-18 PROCEDURE — 71045 X-RAY EXAM CHEST 1 VIEW: CPT

## 2023-05-18 PROCEDURE — 86850 RBC ANTIBODY SCREEN: CPT

## 2023-05-18 PROCEDURE — C1758: CPT

## 2023-05-18 PROCEDURE — 87070 CULTURE OTHR SPECIMN AEROBIC: CPT

## 2023-05-18 PROCEDURE — 87086 URINE CULTURE/COLONY COUNT: CPT

## 2023-05-18 PROCEDURE — C1769: CPT

## 2023-05-18 PROCEDURE — C1889: CPT

## 2023-05-18 PROCEDURE — 88300 SURGICAL PATH GROSS: CPT

## 2023-05-18 PROCEDURE — C9399: CPT

## 2023-05-18 PROCEDURE — 80048 BASIC METABOLIC PNL TOTAL CA: CPT

## 2023-05-18 RX ORDER — SENNA PLUS 8.6 MG/1
2 TABLET ORAL
Qty: 0 | Refills: 0 | DISCHARGE
Start: 2023-05-18

## 2023-05-18 RX ORDER — TAMSULOSIN HYDROCHLORIDE 0.4 MG/1
1 CAPSULE ORAL
Qty: 30 | Refills: 0
Start: 2023-05-18

## 2023-05-18 RX ORDER — PHENAZOPYRIDINE HCL 100 MG
1 TABLET ORAL
Qty: 0 | Refills: 0 | DISCHARGE
Start: 2023-05-18

## 2023-05-18 RX ADMIN — Medication 975 MILLIGRAM(S): at 01:20

## 2023-05-18 RX ADMIN — PANTOPRAZOLE SODIUM 40 MILLIGRAM(S): 20 TABLET, DELAYED RELEASE ORAL at 06:31

## 2023-05-18 RX ADMIN — Medication 15 MILLIGRAM(S): at 10:06

## 2023-05-18 RX ADMIN — Medication 100 MILLIGRAM(S): at 06:32

## 2023-05-18 RX ADMIN — HEPARIN SODIUM 5000 UNIT(S): 5000 INJECTION INTRAVENOUS; SUBCUTANEOUS at 06:31

## 2023-05-18 RX ADMIN — Medication 975 MILLIGRAM(S): at 06:31

## 2023-05-18 RX ADMIN — Medication 975 MILLIGRAM(S): at 11:23

## 2023-05-18 RX ADMIN — SODIUM CHLORIDE 100 MILLILITER(S): 9 INJECTION, SOLUTION INTRAVENOUS at 06:29

## 2023-05-18 RX ADMIN — Medication 200 MILLIGRAM(S): at 06:31

## 2023-05-18 RX ADMIN — Medication 975 MILLIGRAM(S): at 11:53

## 2023-05-18 RX ADMIN — Medication 975 MILLIGRAM(S): at 02:54

## 2023-05-18 RX ADMIN — CYCLOBENZAPRINE HYDROCHLORIDE 5 MILLIGRAM(S): 10 TABLET, FILM COATED ORAL at 10:21

## 2023-05-18 NOTE — DISCHARGE NOTE PROVIDER - NSDCMRMEDTOKEN_GEN_ALL_CORE_FT
omeprazole 20 mg oral delayed release capsule: orally 3 times a week  phenazopyridine 100 mg oral tablet: 1 tab(s) orally every 8 hours  rosuvastatin 5 mg oral tablet: 0.5  orally 3 times a week  senna leaf extract oral tablet: 2 tab(s) orally once a day (at bedtime)   omeprazole 20 mg oral delayed release capsule: orally 3 times a week  rosuvastatin 5 mg oral tablet: 0.5  orally 3 times a week  senna leaf extract oral tablet: 2 tab(s) orally once a day (at bedtime)   Flomax 0.4 mg oral capsule: 1 cap(s) orally once a day  omeprazole 20 mg oral delayed release capsule: orally 3 times a week  rosuvastatin 5 mg oral tablet: 0.5  orally 3 times a week  senna leaf extract oral tablet: 2 tab(s) orally once a day (at bedtime)

## 2023-05-18 NOTE — DISCHARGE NOTE PROVIDER - CARE PROVIDERS DIRECT ADDRESSES
[email or web address]
Universal Safety Interventions

## 2023-05-18 NOTE — DISCHARGE NOTE PROVIDER - HOSPITAL COURSE
Pt is a 63 yo m with a pmh of gerd, hld, and nephrolithiasis who arrived at [facility] on 5/15/23 for a scheduled left pcnl. Please see operative report  for further details.  post op he did well. his vitals were stable, he tolerated diet, his pain was controlled, he ambulated  and his labs were stable.  He underwent successful tov. On POD 2 his nt was removed and there was some bleeding. He remained one additional day. Labs and vitals were stable urine remained herbert and there was no hematoma or ecchymosis at the flank

## 2023-05-18 NOTE — PROGRESS NOTE ADULT - ASSESSMENT
62 year old male s/p left PCNL POD #0  -DVT prophylaxis/OOB  -incentive spirometry  -strict I&O's  -analgesia and antiemetics PRN  -regular diet  -AM labs  -f/u chest x ray   -TOV in morning  -continue ciprofloxacin   -f/u cultures
62 year old male s/p left PCNL POD #1  - am labs  - cipro   - cxs  - tov, tripp removed this am   - pvr   - 2 day plan 
A/P: 62 year old male 62 year old male s/p left PCNL POD #3    - AM labs  - Diet  - Cipro  - pain control   - OOB/ambulate  - Encourage incentive spirometry  - monitor flank  - DVT ppx  - Dispo planning
62 year old male s/p left PCNL POD #2    - attempted to remove the NT at bedside, however, urine in tube became very bloody, and patient had acute pain; was unable to remove the tube, despite removing water from balloon and cutting balloon port, so clamped tube, placed sandbag  - made NPO  - poss IR for angio today   - recheck labs at noon  - pain control

## 2023-05-18 NOTE — DISCHARGE NOTE NURSING/CASE MANAGEMENT/SOCIAL WORK - PATIENT PORTAL LINK FT
You can access the FollowMyHealth Patient Portal offered by [facility] by registering at the following website: [email or web address]. By joining [facility]’s FollowMyHealth portal, you will also be able to view your health information using other applications (apps) compatible with our system.

## 2023-05-18 NOTE — DISCHARGE NOTE PROVIDER - CARE PROVIDER_API CALL
Hoenig, David M (MD)  Urology  [facility]- Dept. of Urology, [address] [place] [address]  Phone: [phone]  Fax: [phone]  Follow Up Time:

## 2023-05-18 NOTE — DISCHARGE NOTE PROVIDER - NSDCCPCAREPLAN_GEN_ALL_CORE_FT
PRINCIPAL DISCHARGE DIAGNOSIS  Diagnosis: Nephrolithiasis  Assessment and Plan of Treatment: you had a percutaneous nephrolithotomy   Call the office if you experience fever, chills, uncontrolled pain, the inability to tolerate liquids, or the urine does not flow   to promote wound healing do not take a bath, continue to walk frequently, return to daily living activities slowly, no heavy lifting greater than 10lbs for 4-6 weeks, follow up Dr Hoenig in two weeks   take stool softeners to avoid constipation   There may be leakage from your back, you may replace the gauze and tape as needed. The hole will close in a few days. Sitting upright will help stop the leakage.        SECONDARY DISCHARGE DIAGNOSES  Diagnosis: HLD (hyperlipidemia)  Assessment and Plan of Treatment: continue home medication

## 2023-05-18 NOTE — PROGRESS NOTE ADULT - SUBJECTIVE AND OBJECTIVE BOX
UROLOGY DAILY PROGRESS NOTE:     Subjective: Patient seen and examined at bedside. No overnight events.       Objective:  Vital signs  T(F): , Max: 99.2 (05-16-23 @ 07:49)  HR: 82 (05-17-23 @ 04:33)  BP: 144/76 (05-17-23 @ 04:33)  SpO2: 96% (05-17-23 @ 04:33)  Wt(kg): --    I&O's Summary    16 May 2023 07:01  -  17 May 2023 07:00  --------------------------------------------------------  IN: 2530 mL / OUT: 3825 mL / NET: -1295 mL        Gen: NAD  Pulm: No respiratory distress, no subcostal retractions  CV: RRR, no JVD  Abd: Soft, NT, ND, NT draining light pink urine  : voiding    Labs:  05-16  10.53 / 39.8  /1.04   05-16  6.08  / 26.8  /0.91                           13.9   10.53 )-----------( 196      ( 16 May 2023 09:23 )             39.8     05-16    133<L>  |  100  |  12  ----------------------------<  178<H>  3.8   |  23  |  1.04    Ca    9.1      16 May 2023 09:23          Urine Cx:        < from: CT Renal Stone Hunt (04.01.23 @ 11:42) >    EXAM: [id number] - CT RENAL STONE MOYA  - ORDERED BY: DAVID M HOENIG      PROCEDURE DATE:  04/01/2023           INTERPRETATION:  CLINICAL INFORMATION: r/o kidney stones; Ordering Dxs:   N20.0 Calculus of kidney /// Admitting Dxs: N20.0 CALCULUS OF KIDNEY YCT    COMPARISON: 9.1.21.    CONTRAST/COMPLICATIONS:  IV Contrast: NONE  Oral Contrast: NONE  Complications: None reported at time of study completion    PROCEDURE:  CT of the Abdomen and Pelvis was performed.  Sagittal and coronal reformats were performed.    FINDINGS:    LOWER CHEST: Within normal limits.    LIVER: Within normal limits.  BILE DUCTS: Normal caliber.  GALLBLADDER: Within normal limits.  SPLEEN: A 1 cm hypodense lower pole lesion is unchanged.  PANCREAS: Within normal limits.  ADRENALS: Within normal limits.  KIDNEYS/URETERS: Malrotated kidneys. Calculi in the left renal pelvis   measuring up to 7 mm with mild pelviectasis.    BLADDER: Within normal limits.  REPRODUCTIVE ORGANS: The prostate is enlarged.    BOWEL: No bowel obstruction.  PERITONEUM: No ascites.  VESSELS:  Within normal limits.  RETROPERITONEUM/LYMPH NODES: No lymphadenopathy.  ABDOMINAL WALL: Within normal limits.  BONES: Within normal limits.    IMPRESSION: Subcentimeter calculi in the left renal pelvis with mild   pelviectasis.      < end of copied text >  
 Post op Check    Pt seen and examined bedside. Pt reports minimal pain surrounding tripp and states it is manageable. Pain is controlled. Denies SOB/CP/N/V.     Vital Signs Last 24 Hrs  T(C): 36.4 (15 May 2023 15:15), Max: 36.6 (15 May 2023 08:12)  T(F): 97.6 (15 May 2023 15:15), Max: 97.9 (15 May 2023 08:12)  HR: 74 (15 May 2023 15:15) (70 - 81)  BP: 147/83 (15 May 2023 15:15) (123/72 - 147/83)  BP(mean): 106 (15 May 2023 14:45) (93 - 106)  RR: 18 (15 May 2023 15:15) (14 - 18)  SpO2: 96% (15 May 2023 15:15) (95% - 100%)    Parameters below as of 15 May 2023 15:15  Patient On (Oxygen Delivery Method): room air        I&O's Summary    15 May 2023 07:01  -  15 May 2023 15:30  --------------------------------------------------------  IN: 125 mL / OUT: 470 mL / NET: -345 mL  Tripp in place, draining light yellow urine  Left nephrostomy tube in place, draining light red fluid      Physical Exam  Gen: NAD, A&Ox3  Pulm: No respiratory distress, no subcostal retractions  CV: RRR, no JVD  Abd: Soft, NT, ND  Back: left nephrostomy tube in place, draining light red fluid, no ecchymosis, tenderness, or edema surrounding NT site.  : tripp in place, draining light yellow urine                           13.0   7.38  )-----------( 186      ( 15 May 2023 13:50 )             37.9       05-15    139  |  104  |  17  ----------------------------<  99  3.8   |  24  |  1.08    Ca    8.6      15 May 2023 13:50      
UROLOGY PA PROGRESS NOTE:     Subjective:  no acute events overnight    Objective:  Vital signs  T(F): , Max: 99.4 (05-15-23 @ 20:35)  HR: 86 (05-16-23 @ 05:10)  BP: 142/76 (05-16-23 @ 05:10)  SpO2: 95% (05-16-23 @ 07:49)  Wt(kg): --    Output     05-15 @ 07:01  -  05-16 @ 07:00  --------------------------------------------------------  IN: 3365 mL / OUT: 3070 mL / NET: 295 mL    05-16 @ 07:01  -  05-16 @ 08:16  --------------------------------------------------------  IN: 240 mL / OUT: 350 mL / NET: -110 mL    tripp 1100  NT 1050    Physical Exam:  Gen: NAD  Abd: soft, NT/ND, left NT draining pink   : +tripp draining clear     Labs:  05-16  6.08  / 26.8  /0.91   05-15  7.38  / 37.9  /1.08                           8.5    6.08  )-----------( 105      ( 16 May 2023 07:11 )             26.8     05-16    118<LL>  |  86<L>  |  10  ----------------------------<  673<HH>  3.5   |  19<L>  |  0.91    Ca    7.3<L>      16 May 2023 07:11            Urine Cx:        
Urology PA Progress Note    Subjective:  Patient seen and examined at bedside. No acute events overnight    Objective:  Vital signs  T(F): , Max: 99.1 (05-17-23 @ 13:01)  HR: 93 (05-18-23 @ 05:21)  BP: 114/77 (05-18-23 @ 05:21)  SpO2: 95% (05-18-23 @ 05:21)  Wt(kg): --    Output     05-17 @ 07:01  -  05-18 @ 07:00  --------------------------------------------------------  IN: 720 mL / OUT: 1400 mL / NET: -680 mL    void 925    Physical Exam:  Gen: NAD. AAOx3  Pulm: nonlabored  Abd: soft, NT/ND  : left back pouch in place with orange clear output    Labs:  05-17  7.56  / 38.4  /1.11   05-16  10.53 / 39.8  /1.04                           13.1   7.56  )-----------( 172      ( 17 May 2023 07:01 )             38.4     05-17    139  |  103  |  9   ----------------------------<  119<H>  3.9   |  26  |  1.11    Ca    9.4      17 May 2023 07:01

## 2023-05-19 ENCOUNTER — NON-APPOINTMENT (OUTPATIENT)
Age: 63
End: 2023-05-19

## 2023-05-19 RX ORDER — TRAMADOL HYDROCHLORIDE 50 MG/1
1 TABLET ORAL
Qty: 12 | Refills: 0
Start: 2023-05-19 | End: 2023-05-21

## 2023-05-23 ENCOUNTER — NON-APPOINTMENT (OUTPATIENT)
Age: 63
End: 2023-05-23

## 2023-05-23 ENCOUNTER — TRANSCRIPTION ENCOUNTER (OUTPATIENT)
Age: 63
End: 2023-05-23

## 2023-05-24 LAB
CELL MATERIAL STONE EST-MCNT: SIGNIFICANT CHANGE UP
LABORATORY COMMENT REPORT: SIGNIFICANT CHANGE UP
NIDUS STONE QN: SIGNIFICANT CHANGE UP

## 2023-05-25 ENCOUNTER — LABORATORY RESULT (OUTPATIENT)
Age: 63
End: 2023-05-25

## 2023-05-25 ENCOUNTER — APPOINTMENT (OUTPATIENT)
Dept: UROLOGY | Facility: CLINIC | Age: 63
End: 2023-05-25
Payer: COMMERCIAL

## 2023-05-25 ENCOUNTER — APPOINTMENT (OUTPATIENT)
Dept: CT IMAGING | Facility: CLINIC | Age: 63
End: 2023-05-25

## 2023-05-25 ENCOUNTER — OUTPATIENT (OUTPATIENT)
Dept: OUTPATIENT SERVICES | Facility: HOSPITAL | Age: 63
LOS: 1 days | End: 2023-05-25
Payer: COMMERCIAL

## 2023-05-25 ENCOUNTER — NON-APPOINTMENT (OUTPATIENT)
Age: 63
End: 2023-05-25

## 2023-05-25 VITALS
HEIGHT: 69 IN | RESPIRATION RATE: 15 BRPM | WEIGHT: 168 LBS | DIASTOLIC BLOOD PRESSURE: 73 MMHG | OXYGEN SATURATION: 96 % | TEMPERATURE: 98.2 F | HEART RATE: 83 BPM | SYSTOLIC BLOOD PRESSURE: 125 MMHG | BODY MASS INDEX: 24.88 KG/M2

## 2023-05-25 DIAGNOSIS — Z98.890 OTHER SPECIFIED POSTPROCEDURAL STATES: Chronic | ICD-10-CM

## 2023-05-25 DIAGNOSIS — Z87.442 PERSONAL HISTORY OF URINARY CALCULI: ICD-10-CM

## 2023-05-25 PROCEDURE — 99024 POSTOP FOLLOW-UP VISIT: CPT

## 2023-05-25 PROCEDURE — 74176 CT ABD & PELVIS W/O CONTRAST: CPT | Mod: 26

## 2023-05-25 PROCEDURE — 74176 CT ABD & PELVIS W/O CONTRAST: CPT

## 2023-05-25 NOTE — ASSESSMENT
[FreeTextEntry1] : Back pain appears lower, musculoskeletal.\par Unsure about sensation of lower abd discomfort, fullness.\par \par 1. will check cbc, bmp today here\par 2. CT stone hunt for eval any collections, residual stone, constipation/obstipation or other abd issue--\par \par will review results as they are done.\par Pt and wife comfortable with plan and will proceed\par continuing on muscle relaxants as well
Yes

## 2023-05-25 NOTE — HISTORY OF PRESENT ILLNESS
[FreeTextEntry1] : Pt is s/p left PCNL on 5/15/23, via ectopic kidney. January well, nephroureteral cath removed in hosp.\par \par Has called regarding low back pain, some constipation. Today, persistence of pain prompts eval.\par \par No fever. Fatigued.

## 2023-05-25 NOTE — PHYSICAL EXAM
[General Appearance - Well Developed] : well developed [General Appearance - Well Nourished] : well nourished [Normal Appearance] : normal appearance [Well Groomed] : well groomed [General Appearance - In No Acute Distress] : no acute distress [Abdomen Soft] : soft [Costovertebral Angle Tenderness] : no ~M costovertebral angle tenderness [Urinary Bladder Findings] : the bladder was normal on palpation [Edema] : no peripheral edema [] : no respiratory distress [Respiration, Rhythm And Depth] : normal respiratory rhythm and effort [Exaggerated Use Of Accessory Muscles For Inspiration] : no accessory muscle use [Oriented To Time, Place, And Person] : oriented to person, place, and time [Affect] : the affect was normal [Mood] : the mood was normal [Not Anxious] : not anxious [Normal Station and Gait] : the gait and station were normal for the patient's age [FreeTextEntry1] : tender lower spine, bilateral lower back paraspinal [No Focal Deficits] : no focal deficits

## 2023-05-26 LAB
ANION GAP SERPL CALC-SCNC: 13 MMOL/L
BUN SERPL-MCNC: 20 MG/DL
CALCIUM SERPL-MCNC: 9.6 MG/DL
CHLORIDE SERPL-SCNC: 104 MMOL/L
CO2 SERPL-SCNC: 25 MMOL/L
CREAT SERPL-MCNC: 1.37 MG/DL
EGFR: 58 ML/MIN/1.73M2
GLUCOSE SERPL-MCNC: 104 MG/DL
POTASSIUM SERPL-SCNC: 4.6 MMOL/L
SODIUM SERPL-SCNC: 142 MMOL/L

## 2023-05-30 ENCOUNTER — APPOINTMENT (OUTPATIENT)
Dept: UROLOGY | Facility: CLINIC | Age: 63
End: 2023-05-30
Payer: COMMERCIAL

## 2023-05-30 DIAGNOSIS — Q63.2 ECTOPIC KIDNEY: ICD-10-CM

## 2023-05-30 DIAGNOSIS — R82.992 HYPEROXALURIA: ICD-10-CM

## 2023-05-30 PROCEDURE — 99443: CPT

## 2023-05-30 NOTE — ASSESSMENT
[FreeTextEntry1] : Diet modification reviewed at length- increasing fluids (primarily water), citrus is good, and decreasing/moderating salt, animal flesh protein, oxalate containing foods, and moderation of calcium intake (1000 mg/day is USRDA).\par \par I reviewed with the patient the risks of metabolic stone disease given their underlying risk parameters (all of which include large stones, multiple stones, bilateral stones, family history, and young age), and the indications for 24 hour urine metabolic assessment.\par \par DASH diet a good overall diet plan to consider and review for general health and stone health.\par \par We also discussed benefits of regular exercise and weight loss as independent risk reducers for stones.\par \par 1. Diet modification as discussed\par 2. 24 hour urine collection x 1 in the next few weeks\par 3. RTC with renal US in ~ 8 weeks\par 4. Pt instructed to self test urine pH, 2 to 3 times per day using pH paper, and to record results over next ~ 1 week.\par \par Target pH range for UA stone prevention is between 6-7\par \par If pH < 6, pt to call and will expect/need to titrate up.\par \par For additional alkalinization of the urine, can also try 1/4 teaspoon baking soda in water or juice po bid for alkalinization-- studies have show sig alkalinization with low risks, though concerns about the addition of sodium also reviewed with pt.\par

## 2023-05-30 NOTE — HISTORY OF PRESENT ILLNESS
[Other Location: e.g. School (Enter Location, City,State)___] : at [unfilled], at the time of the visit. [Spouse] : spouse [Verbal consent obtained from patient] : the patient, [unfilled] [None] : no symptoms [FreeTextEntry1] : The patient-doctor relationship has been established in a face to face fashion via real time video/audio HIPAA compliant communication using telemedicine software. The patient's identity has been confirmed. The patient was previously emailed a copy of the telemedicine consent. They have had a chance to review and has now given verbal consent and has requested care to be assessed and treated via telemedicine. They understand there may be limitations in this process, and that they may need further followup care in the office and/or hospital settings.\par \par Verbal consent given on May 30 2023 11:40AM\par \par LEYDI LIN is a 62 year M who presents for metabolic evaluation and prevention of future stone disease following recent surgery for stone.  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 Seen last week for back pain, fatigue, but CT looks good- no sig stones residual. Labs show gfr slight decreased, cre to 1.37, cbc stable with 8.3 wbc.\par \par Stone analysis:   \par 100% Uric acid\par \par \par \par \par \par \par \par \par 05/30/2023 \par \par The patient denies fevers, chills, nausea and/or vomiting, and no unexplained weight loss.\par \par All pertinent parts of the patient PFSH (past medical, family, and social histories), laboratory, radiological studies and available physician notes were reviewed prior to starting the face-to-face portion of the telemedicine visit. Questionnaire results, where appropriate, were discussed with the patient.\par

## 2023-06-07 ENCOUNTER — NON-APPOINTMENT (OUTPATIENT)
Age: 63
End: 2023-06-07

## 2023-06-08 ENCOUNTER — TRANSCRIPTION ENCOUNTER (OUTPATIENT)
Age: 63
End: 2023-06-08

## 2023-07-28 ENCOUNTER — APPOINTMENT (OUTPATIENT)
Dept: INTERNAL MEDICINE | Facility: CLINIC | Age: 63
End: 2023-07-28
Payer: COMMERCIAL

## 2023-07-28 VITALS
BODY MASS INDEX: 23.99 KG/M2 | OXYGEN SATURATION: 97 % | SYSTOLIC BLOOD PRESSURE: 127 MMHG | HEIGHT: 69 IN | RESPIRATION RATE: 15 BRPM | WEIGHT: 162 LBS | TEMPERATURE: 98 F | HEART RATE: 70 BPM | DIASTOLIC BLOOD PRESSURE: 79 MMHG

## 2023-07-28 DIAGNOSIS — J30.2 OTHER SEASONAL ALLERGIC RHINITIS: ICD-10-CM

## 2023-07-28 DIAGNOSIS — Z12.5 ENCOUNTER FOR SCREENING FOR MALIGNANT NEOPLASM OF PROSTATE: ICD-10-CM

## 2023-07-28 DIAGNOSIS — M54.12 RADICULOPATHY, CERVICAL REGION: ICD-10-CM

## 2023-07-28 DIAGNOSIS — Z87.898 PERSONAL HISTORY OF OTHER SPECIFIED CONDITIONS: ICD-10-CM

## 2023-07-28 DIAGNOSIS — N40.1 BENIGN PROSTATIC HYPERPLASIA WITH LOWER URINARY TRACT SYMPMS: ICD-10-CM

## 2023-07-28 DIAGNOSIS — R97.20 ELEVATED PROSTATE, SPECIFIC ANTIGEN [PSA]: ICD-10-CM

## 2023-07-28 DIAGNOSIS — Z87.442 PERSONAL HISTORY OF URINARY CALCULI: ICD-10-CM

## 2023-07-28 DIAGNOSIS — Z86.19 PERSONAL HISTORY OF OTHER INFECTIOUS AND PARASITIC DISEASES: ICD-10-CM

## 2023-07-28 DIAGNOSIS — M54.50 LOW BACK PAIN, UNSPECIFIED: ICD-10-CM

## 2023-07-28 DIAGNOSIS — Z00.00 ENCOUNTER FOR GENERAL ADULT MEDICAL EXAMINATION W/OUT ABNORMAL FINDINGS: ICD-10-CM

## 2023-07-28 DIAGNOSIS — R73.01 IMPAIRED FASTING GLUCOSE: ICD-10-CM

## 2023-07-28 DIAGNOSIS — E55.9 VITAMIN D DEFICIENCY, UNSPECIFIED: ICD-10-CM

## 2023-07-28 DIAGNOSIS — L85.3 XEROSIS CUTIS: ICD-10-CM

## 2023-07-28 DIAGNOSIS — R35.1 NOCTURIA: ICD-10-CM

## 2023-07-28 DIAGNOSIS — R35.1 BENIGN PROSTATIC HYPERPLASIA WITH LOWER URINARY TRACT SYMPMS: ICD-10-CM

## 2023-07-28 DIAGNOSIS — K21.9 GASTRO-ESOPHAGEAL REFLUX DISEASE W/OUT ESOPHAGITIS: ICD-10-CM

## 2023-07-28 PROCEDURE — 36415 COLL VENOUS BLD VENIPUNCTURE: CPT

## 2023-07-28 PROCEDURE — 99396 PREV VISIT EST AGE 40-64: CPT | Mod: 25

## 2023-07-28 RX ORDER — METHYLPREDNISOLONE 4 MG/1
4 TABLET ORAL
Qty: 1 | Refills: 0 | Status: DISCONTINUED | COMMUNITY
Start: 2023-03-31 | End: 2023-07-28

## 2023-07-28 RX ORDER — B-COMPLEX WITH VITAMIN C
TABLET ORAL
Refills: 0 | Status: ACTIVE | COMMUNITY

## 2023-07-28 RX ORDER — TRAMADOL HYDROCHLORIDE 50 MG/1
50 TABLET, COATED ORAL
Qty: 10 | Refills: 0 | Status: DISCONTINUED | COMMUNITY
Start: 2023-05-23 | End: 2023-07-28

## 2023-07-28 RX ORDER — METAXALONE 800 MG/1
800 TABLET ORAL 3 TIMES DAILY
Qty: 30 | Refills: 3 | Status: ACTIVE | COMMUNITY
Start: 2018-01-24 | End: 1900-01-01

## 2023-08-08 LAB
STONE, AMMONIUM 24 HR URINE: 9
STONE, BRUSHITE: 0.43 RATIO
STONE, CALCIUM 24 HR URINE: 64.3 MG/24 HR
STONE, CALCIUM OXALATE: 1.52 RATIO
STONE, CITRATE 24 HR URINE: 608 MG/24 HR
STONE, CREATININE 24 HR URINE: 1419.3 MG/24 HR
STONE, MAGNESIUM 24 HR URINE: 48 MG/24 HR
STONE, OXALATE 24 HR URINE: 35 MG/24 HR
STONE, PH URINE: 6.6
STONE, PHOSPHORUS 24 HR URINE: 670.2 MG/24 HR
STONE, POTASSIUM 24 HR URINE: 101.1
STONE, SODIUM 24 HR URINE: 141
STONE, SODIUM URATE: 1.8 RATIO
STONE, STRUVITE: 0.01 RATIO
STONE, SULFATE 24 HR URINE: 27
STONE, URIC ACID 24 HR URINE: 467 MG/24 HR
STONE, URIC ACID URINE: 0.27 RATIO
TOTAL VOLUME URINE: 2075 ML/24 HR

## 2023-08-09 ENCOUNTER — APPOINTMENT (OUTPATIENT)
Dept: UROLOGY | Facility: CLINIC | Age: 63
End: 2023-08-09
Payer: COMMERCIAL

## 2023-08-09 ENCOUNTER — OUTPATIENT (OUTPATIENT)
Dept: OUTPATIENT SERVICES | Facility: HOSPITAL | Age: 63
LOS: 1 days | End: 2023-08-09
Payer: COMMERCIAL

## 2023-08-09 DIAGNOSIS — Z98.890 OTHER SPECIFIED POSTPROCEDURAL STATES: Chronic | ICD-10-CM

## 2023-08-09 DIAGNOSIS — R35.0 FREQUENCY OF MICTURITION: ICD-10-CM

## 2023-08-09 PROCEDURE — 99214 OFFICE O/P EST MOD 30 MIN: CPT | Mod: 24,25

## 2023-08-09 PROCEDURE — 76770 US EXAM ABDO BACK WALL COMP: CPT | Mod: 26

## 2023-08-09 PROCEDURE — 76770 US EXAM ABDO BACK WALL COMP: CPT

## 2023-08-09 NOTE — PHYSICAL EXAM
[General Appearance - Well Developed] : well developed [Skin Color & Pigmentation] : normal skin color and pigmentation [] : no respiratory distress [Oriented To Time, Place, And Person] : oriented to person, place, and time [Normal Station and Gait] : the gait and station were normal for the patient's age [Abdomen Soft] : soft [Abdomen Tenderness] : non-tender [Costovertebral Angle Tenderness] : no ~M costovertebral angle tenderness [Urinary Bladder Findings] : the bladder was normal on palpation [Edema] : no peripheral edema [No Focal Deficits] : no focal deficits

## 2023-08-09 NOTE — HISTORY OF PRESENT ILLNESS
[None] : None [FreeTextEntry1] : 62 yr old male presents to follow up with kidney stones. Pt denies dysuria, hematuria or abd/flank pain. Pt complaining of increased nocturia x 4 last week, improved back to baseline 1-2 this week.    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 January well, nephroureteral cath removed in hosp.  PSA 3.8- 07/28/23 4.8 - 01/2023 4.8- 02/2022  [Dysuria] : no dysuria [Hematuria - Gross] : no gross hematuria

## 2023-08-09 NOTE — ASSESSMENT
[FreeTextEntry1] : Renal US reviewed: no stone, no hydro, no solid renal mass. There is 5 cm hyperechoic focus left lower pole on uncertain clinical significance- no evidence for AML on prior CT scan. There are tiny cysts bilat, 7 mm right, left 1.1 cm (without doppler flow, with thin septations). There are echogenic foci bilat in parenchyma and which appear more likely vascular- no stones on CT post op. No retroperitoneal collection. Left kidney malrotated.  Suspect postoperative changes- no AML or other regions on prior CT's.  24 hr urine- good volume, good sodium and animal protein, pH 6.6, good oxalate   plan 1)urine culture today 2) 24 hr urine  3)RTC in 6 months with Renal US

## 2023-08-10 DIAGNOSIS — N20.0 CALCULUS OF KIDNEY: ICD-10-CM

## 2023-08-10 DIAGNOSIS — N20.9 URINARY CALCULUS, UNSPECIFIED: ICD-10-CM

## 2023-08-10 LAB — BACTERIA UR CULT: NORMAL

## 2023-08-12 ENCOUNTER — TRANSCRIPTION ENCOUNTER (OUTPATIENT)
Age: 63
End: 2023-08-12

## 2023-08-12 LAB
25(OH)D3 SERPL-MCNC: 35.1 NG/ML
ALBUMIN SERPL ELPH-MCNC: 4.5 G/DL
ALP BLD-CCNC: 65 U/L
ALT SERPL-CCNC: 23 U/L
ANION GAP SERPL CALC-SCNC: 13 MMOL/L
APPEARANCE: CLEAR
AST SERPL-CCNC: 24 U/L
BACTERIA UR CULT: NORMAL
BILIRUB SERPL-MCNC: 0.5 MG/DL
BILIRUBIN URINE: NEGATIVE
BLOOD URINE: NEGATIVE
BUN SERPL-MCNC: 16 MG/DL
CALCIUM SERPL-MCNC: 9.8 MG/DL
CHLORIDE SERPL-SCNC: 103 MMOL/L
CHOLEST SERPL-MCNC: 187 MG/DL
CO2 SERPL-SCNC: 26 MMOL/L
COLOR: YELLOW
CREAT SERPL-MCNC: 1.15 MG/DL
EGFR: 72 ML/MIN/1.73M2
ESTIMATED AVERAGE GLUCOSE: 105 MG/DL
GLUCOSE QUALITATIVE U: NEGATIVE MG/DL
GLUCOSE SERPL-MCNC: 97 MG/DL
HBA1C MFR BLD HPLC: 5.3 %
HDLC SERPL-MCNC: 57 MG/DL
KETONES URINE: NEGATIVE MG/DL
LDLC SERPL CALC-MCNC: 112 MG/DL
LEUKOCYTE ESTERASE URINE: NEGATIVE
NITRITE URINE: NEGATIVE
NONHDLC SERPL-MCNC: 129 MG/DL
PH URINE: 7
POTASSIUM SERPL-SCNC: 4.4 MMOL/L
PROT SERPL-MCNC: 6.8 G/DL
PROTEIN URINE: NEGATIVE MG/DL
PSA SERPL-MCNC: 3.88 NG/ML
SODIUM SERPL-SCNC: 141 MMOL/L
SPECIFIC GRAVITY URINE: 1.01
TRIGL SERPL-MCNC: 96 MG/DL
TSH SERPL-ACNC: 1.88 UIU/ML
URATE SERPL-MCNC: 6.5 MG/DL
UROBILINOGEN URINE: 0.2 MG/DL

## 2023-11-14 ENCOUNTER — APPOINTMENT (OUTPATIENT)
Dept: ORTHOPEDIC SURGERY | Facility: CLINIC | Age: 63
End: 2023-11-14
Payer: COMMERCIAL

## 2023-11-14 VITALS — WEIGHT: 164 LBS | HEIGHT: 69 IN | BODY MASS INDEX: 24.29 KG/M2

## 2023-11-14 DIAGNOSIS — M75.41 IMPINGEMENT SYNDROME OF RIGHT SHOULDER: ICD-10-CM

## 2023-11-14 DIAGNOSIS — M75.21 BICIPITAL TENDINITIS, RIGHT SHOULDER: ICD-10-CM

## 2023-11-14 DIAGNOSIS — M50.90 CERVICAL DISC DISORDER, UNSPECIFIED, UNSPECIFIED CERVICAL REGION: ICD-10-CM

## 2023-11-14 PROCEDURE — 99214 OFFICE O/P EST MOD 30 MIN: CPT | Mod: 25

## 2023-11-14 PROCEDURE — 73030 X-RAY EXAM OF SHOULDER: CPT | Mod: RT

## 2023-11-14 PROCEDURE — 20611 DRAIN/INJ JOINT/BURSA W/US: CPT | Mod: RT

## 2023-11-14 PROCEDURE — J3490M: CUSTOM

## 2023-11-14 PROCEDURE — 72040 X-RAY EXAM NECK SPINE 2-3 VW: CPT

## 2023-11-14 PROCEDURE — 73010 X-RAY EXAM OF SHOULDER BLADE: CPT | Mod: RT

## 2023-11-23 ENCOUNTER — INPATIENT (INPATIENT)
Facility: HOSPITAL | Age: 63
LOS: 0 days | Discharge: HOME | DRG: 299 | End: 2023-11-24
Attending: INTERNAL MEDICINE | Admitting: STUDENT IN AN ORGANIZED HEALTH CARE EDUCATION/TRAINING PROGRAM
Payer: COMMERCIAL

## 2023-11-23 VITALS
SYSTOLIC BLOOD PRESSURE: 172 MMHG | WEIGHT: 164.02 LBS | RESPIRATION RATE: 16 BRPM | OXYGEN SATURATION: 98 % | HEART RATE: 74 BPM | TEMPERATURE: 98 F | HEIGHT: 69 IN | DIASTOLIC BLOOD PRESSURE: 85 MMHG

## 2023-11-23 DIAGNOSIS — I80.00 PHLEBITIS AND THROMBOPHLEBITIS OF SUPERFICIAL VESSELS OF UNSPECIFIED LOWER EXTREMITY: ICD-10-CM

## 2023-11-23 DIAGNOSIS — E78.5 HYPERLIPIDEMIA, UNSPECIFIED: ICD-10-CM

## 2023-11-23 DIAGNOSIS — N20.0 CALCULUS OF KIDNEY: ICD-10-CM

## 2023-11-23 DIAGNOSIS — I26.99 OTHER PULMONARY EMBOLISM WITHOUT ACUTE COR PULMONALE: ICD-10-CM

## 2023-11-23 DIAGNOSIS — K21.9 GASTRO-ESOPHAGEAL REFLUX DISEASE WITHOUT ESOPHAGITIS: ICD-10-CM

## 2023-11-23 DIAGNOSIS — Z29.9 ENCOUNTER FOR PROPHYLACTIC MEASURES, UNSPECIFIED: ICD-10-CM

## 2023-11-23 DIAGNOSIS — Z98.890 OTHER SPECIFIED POSTPROCEDURAL STATES: Chronic | ICD-10-CM

## 2023-11-23 LAB
ALBUMIN SERPL ELPH-MCNC: 3.8 G/DL — SIGNIFICANT CHANGE UP (ref 3.3–5)
ALBUMIN SERPL ELPH-MCNC: 3.8 G/DL — SIGNIFICANT CHANGE UP (ref 3.3–5)
ALP SERPL-CCNC: 70 U/L — SIGNIFICANT CHANGE UP (ref 40–120)
ALP SERPL-CCNC: 70 U/L — SIGNIFICANT CHANGE UP (ref 40–120)
ALT FLD-CCNC: 27 U/L — SIGNIFICANT CHANGE UP (ref 12–78)
ALT FLD-CCNC: 27 U/L — SIGNIFICANT CHANGE UP (ref 12–78)
ANION GAP SERPL CALC-SCNC: 5 MMOL/L — SIGNIFICANT CHANGE UP (ref 5–17)
ANION GAP SERPL CALC-SCNC: 5 MMOL/L — SIGNIFICANT CHANGE UP (ref 5–17)
APTT BLD: 33.4 SEC — SIGNIFICANT CHANGE UP (ref 24.5–35.6)
APTT BLD: 33.4 SEC — SIGNIFICANT CHANGE UP (ref 24.5–35.6)
AST SERPL-CCNC: 20 U/L — SIGNIFICANT CHANGE UP (ref 15–37)
AST SERPL-CCNC: 20 U/L — SIGNIFICANT CHANGE UP (ref 15–37)
BASOPHILS # BLD AUTO: 0.04 K/UL — SIGNIFICANT CHANGE UP (ref 0–0.2)
BASOPHILS # BLD AUTO: 0.04 K/UL — SIGNIFICANT CHANGE UP (ref 0–0.2)
BASOPHILS NFR BLD AUTO: 0.4 % — SIGNIFICANT CHANGE UP (ref 0–2)
BASOPHILS NFR BLD AUTO: 0.4 % — SIGNIFICANT CHANGE UP (ref 0–2)
BILIRUB SERPL-MCNC: 0.8 MG/DL — SIGNIFICANT CHANGE UP (ref 0.2–1.2)
BILIRUB SERPL-MCNC: 0.8 MG/DL — SIGNIFICANT CHANGE UP (ref 0.2–1.2)
BUN SERPL-MCNC: 15 MG/DL — SIGNIFICANT CHANGE UP (ref 7–23)
BUN SERPL-MCNC: 15 MG/DL — SIGNIFICANT CHANGE UP (ref 7–23)
CALCIUM SERPL-MCNC: 9.2 MG/DL — SIGNIFICANT CHANGE UP (ref 8.5–10.1)
CALCIUM SERPL-MCNC: 9.2 MG/DL — SIGNIFICANT CHANGE UP (ref 8.5–10.1)
CHLORIDE SERPL-SCNC: 106 MMOL/L — SIGNIFICANT CHANGE UP (ref 96–108)
CHLORIDE SERPL-SCNC: 106 MMOL/L — SIGNIFICANT CHANGE UP (ref 96–108)
CO2 SERPL-SCNC: 30 MMOL/L — SIGNIFICANT CHANGE UP (ref 22–31)
CO2 SERPL-SCNC: 30 MMOL/L — SIGNIFICANT CHANGE UP (ref 22–31)
CREAT SERPL-MCNC: 1 MG/DL — SIGNIFICANT CHANGE UP (ref 0.5–1.3)
CREAT SERPL-MCNC: 1 MG/DL — SIGNIFICANT CHANGE UP (ref 0.5–1.3)
EGFR: 84 ML/MIN/1.73M2 — SIGNIFICANT CHANGE UP
EGFR: 84 ML/MIN/1.73M2 — SIGNIFICANT CHANGE UP
EOSINOPHIL # BLD AUTO: 0.28 K/UL — SIGNIFICANT CHANGE UP (ref 0–0.5)
EOSINOPHIL # BLD AUTO: 0.28 K/UL — SIGNIFICANT CHANGE UP (ref 0–0.5)
EOSINOPHIL NFR BLD AUTO: 3 % — SIGNIFICANT CHANGE UP (ref 0–6)
EOSINOPHIL NFR BLD AUTO: 3 % — SIGNIFICANT CHANGE UP (ref 0–6)
GLUCOSE SERPL-MCNC: 110 MG/DL — HIGH (ref 70–99)
GLUCOSE SERPL-MCNC: 110 MG/DL — HIGH (ref 70–99)
HCT VFR BLD CALC: 41.7 % — SIGNIFICANT CHANGE UP (ref 39–50)
HCT VFR BLD CALC: 41.7 % — SIGNIFICANT CHANGE UP (ref 39–50)
HGB BLD-MCNC: 14.1 G/DL — SIGNIFICANT CHANGE UP (ref 13–17)
HGB BLD-MCNC: 14.1 G/DL — SIGNIFICANT CHANGE UP (ref 13–17)
IMM GRANULOCYTES NFR BLD AUTO: 1.2 % — HIGH (ref 0–0.9)
IMM GRANULOCYTES NFR BLD AUTO: 1.2 % — HIGH (ref 0–0.9)
INR BLD: 0.94 RATIO — SIGNIFICANT CHANGE UP (ref 0.85–1.18)
INR BLD: 0.94 RATIO — SIGNIFICANT CHANGE UP (ref 0.85–1.18)
LYMPHOCYTES # BLD AUTO: 1.38 K/UL — SIGNIFICANT CHANGE UP (ref 1–3.3)
LYMPHOCYTES # BLD AUTO: 1.38 K/UL — SIGNIFICANT CHANGE UP (ref 1–3.3)
LYMPHOCYTES # BLD AUTO: 15 % — SIGNIFICANT CHANGE UP (ref 13–44)
LYMPHOCYTES # BLD AUTO: 15 % — SIGNIFICANT CHANGE UP (ref 13–44)
MCHC RBC-ENTMCNC: 30.1 PG — SIGNIFICANT CHANGE UP (ref 27–34)
MCHC RBC-ENTMCNC: 30.1 PG — SIGNIFICANT CHANGE UP (ref 27–34)
MCHC RBC-ENTMCNC: 33.8 GM/DL — SIGNIFICANT CHANGE UP (ref 32–36)
MCHC RBC-ENTMCNC: 33.8 GM/DL — SIGNIFICANT CHANGE UP (ref 32–36)
MCV RBC AUTO: 88.9 FL — SIGNIFICANT CHANGE UP (ref 80–100)
MCV RBC AUTO: 88.9 FL — SIGNIFICANT CHANGE UP (ref 80–100)
MONOCYTES # BLD AUTO: 0.97 K/UL — HIGH (ref 0–0.9)
MONOCYTES # BLD AUTO: 0.97 K/UL — HIGH (ref 0–0.9)
MONOCYTES NFR BLD AUTO: 10.5 % — SIGNIFICANT CHANGE UP (ref 2–14)
MONOCYTES NFR BLD AUTO: 10.5 % — SIGNIFICANT CHANGE UP (ref 2–14)
NEUTROPHILS # BLD AUTO: 6.43 K/UL — SIGNIFICANT CHANGE UP (ref 1.8–7.4)
NEUTROPHILS # BLD AUTO: 6.43 K/UL — SIGNIFICANT CHANGE UP (ref 1.8–7.4)
NEUTROPHILS NFR BLD AUTO: 69.9 % — SIGNIFICANT CHANGE UP (ref 43–77)
NEUTROPHILS NFR BLD AUTO: 69.9 % — SIGNIFICANT CHANGE UP (ref 43–77)
NRBC # BLD: 0 /100 WBCS — SIGNIFICANT CHANGE UP (ref 0–0)
NRBC # BLD: 0 /100 WBCS — SIGNIFICANT CHANGE UP (ref 0–0)
NT-PROBNP SERPL-SCNC: 123 PG/ML — SIGNIFICANT CHANGE UP (ref 0–125)
NT-PROBNP SERPL-SCNC: 123 PG/ML — SIGNIFICANT CHANGE UP (ref 0–125)
PLATELET # BLD AUTO: 145 K/UL — LOW (ref 150–400)
PLATELET # BLD AUTO: 145 K/UL — LOW (ref 150–400)
POTASSIUM SERPL-MCNC: 4.4 MMOL/L — SIGNIFICANT CHANGE UP (ref 3.5–5.3)
POTASSIUM SERPL-MCNC: 4.4 MMOL/L — SIGNIFICANT CHANGE UP (ref 3.5–5.3)
POTASSIUM SERPL-SCNC: 4.4 MMOL/L — SIGNIFICANT CHANGE UP (ref 3.5–5.3)
POTASSIUM SERPL-SCNC: 4.4 MMOL/L — SIGNIFICANT CHANGE UP (ref 3.5–5.3)
PROT SERPL-MCNC: 7.6 G/DL — SIGNIFICANT CHANGE UP (ref 6–8.3)
PROT SERPL-MCNC: 7.6 G/DL — SIGNIFICANT CHANGE UP (ref 6–8.3)
PROTHROM AB SERPL-ACNC: 11 SEC — SIGNIFICANT CHANGE UP (ref 9.5–13)
PROTHROM AB SERPL-ACNC: 11 SEC — SIGNIFICANT CHANGE UP (ref 9.5–13)
RBC # BLD: 4.69 M/UL — SIGNIFICANT CHANGE UP (ref 4.2–5.8)
RBC # BLD: 4.69 M/UL — SIGNIFICANT CHANGE UP (ref 4.2–5.8)
RBC # FLD: 13.1 % — SIGNIFICANT CHANGE UP (ref 10.3–14.5)
RBC # FLD: 13.1 % — SIGNIFICANT CHANGE UP (ref 10.3–14.5)
SODIUM SERPL-SCNC: 141 MMOL/L — SIGNIFICANT CHANGE UP (ref 135–145)
SODIUM SERPL-SCNC: 141 MMOL/L — SIGNIFICANT CHANGE UP (ref 135–145)
TROPONIN I, HIGH SENSITIVITY RESULT: 4.5 NG/L — SIGNIFICANT CHANGE UP
TROPONIN I, HIGH SENSITIVITY RESULT: 4.5 NG/L — SIGNIFICANT CHANGE UP
WBC # BLD: 9.21 K/UL — SIGNIFICANT CHANGE UP (ref 3.8–10.5)
WBC # BLD: 9.21 K/UL — SIGNIFICANT CHANGE UP (ref 3.8–10.5)
WBC # FLD AUTO: 9.21 K/UL — SIGNIFICANT CHANGE UP (ref 3.8–10.5)
WBC # FLD AUTO: 9.21 K/UL — SIGNIFICANT CHANGE UP (ref 3.8–10.5)

## 2023-11-23 PROCEDURE — 93971 EXTREMITY STUDY: CPT | Mod: 26,LT

## 2023-11-23 PROCEDURE — 99222 1ST HOSP IP/OBS MODERATE 55: CPT | Mod: GC

## 2023-11-23 PROCEDURE — 99285 EMERGENCY DEPT VISIT HI MDM: CPT

## 2023-11-23 PROCEDURE — 93010 ELECTROCARDIOGRAM REPORT: CPT

## 2023-11-23 PROCEDURE — 71275 CT ANGIOGRAPHY CHEST: CPT | Mod: 26,MA

## 2023-11-23 RX ORDER — LANOLIN ALCOHOL/MO/W.PET/CERES
3 CREAM (GRAM) TOPICAL AT BEDTIME
Refills: 0 | Status: DISCONTINUED | OUTPATIENT
Start: 2023-11-23 | End: 2023-11-24

## 2023-11-23 RX ORDER — POTASSIUM CITRATE MONOHYDRATE 100 %
1 POWDER (GRAM) MISCELLANEOUS
Refills: 0 | DISCHARGE

## 2023-11-23 RX ORDER — ACETAMINOPHEN 500 MG
650 TABLET ORAL EVERY 6 HOURS
Refills: 0 | Status: DISCONTINUED | OUTPATIENT
Start: 2023-11-23 | End: 2023-11-24

## 2023-11-23 RX ORDER — ATORVASTATIN CALCIUM 80 MG/1
20 TABLET, FILM COATED ORAL AT BEDTIME
Refills: 0 | Status: DISCONTINUED | OUTPATIENT
Start: 2023-11-23 | End: 2023-11-24

## 2023-11-23 RX ORDER — ROSUVASTATIN CALCIUM 5 MG/1
0.5 TABLET ORAL
Qty: 0 | Refills: 0 | DISCHARGE

## 2023-11-23 RX ORDER — HEPARIN SODIUM 5000 [USP'U]/ML
6000 INJECTION INTRAVENOUS; SUBCUTANEOUS ONCE
Refills: 0 | Status: COMPLETED | OUTPATIENT
Start: 2023-11-23 | End: 2023-11-23

## 2023-11-23 RX ORDER — SODIUM CHLORIDE 9 MG/ML
1000 INJECTION INTRAMUSCULAR; INTRAVENOUS; SUBCUTANEOUS ONCE
Refills: 0 | Status: COMPLETED | OUTPATIENT
Start: 2023-11-23 | End: 2023-11-23

## 2023-11-23 RX ORDER — HEPARIN SODIUM 5000 [USP'U]/ML
INJECTION INTRAVENOUS; SUBCUTANEOUS
Qty: 25000 | Refills: 0 | Status: DISCONTINUED | OUTPATIENT
Start: 2023-11-23 | End: 2023-11-24

## 2023-11-23 RX ORDER — HEPARIN SODIUM 5000 [USP'U]/ML
6000 INJECTION INTRAVENOUS; SUBCUTANEOUS EVERY 6 HOURS
Refills: 0 | Status: DISCONTINUED | OUTPATIENT
Start: 2023-11-23 | End: 2023-11-24

## 2023-11-23 RX ORDER — PANTOPRAZOLE SODIUM 20 MG/1
40 TABLET, DELAYED RELEASE ORAL
Refills: 0 | Status: DISCONTINUED | OUTPATIENT
Start: 2023-11-23 | End: 2023-11-24

## 2023-11-23 RX ORDER — HEPARIN SODIUM 5000 [USP'U]/ML
3000 INJECTION INTRAVENOUS; SUBCUTANEOUS EVERY 6 HOURS
Refills: 0 | Status: DISCONTINUED | OUTPATIENT
Start: 2023-11-23 | End: 2023-11-24

## 2023-11-23 RX ORDER — ONDANSETRON 8 MG/1
4 TABLET, FILM COATED ORAL EVERY 8 HOURS
Refills: 0 | Status: DISCONTINUED | OUTPATIENT
Start: 2023-11-23 | End: 2023-11-24

## 2023-11-23 RX ORDER — TAMSULOSIN HYDROCHLORIDE 0.4 MG/1
0.4 CAPSULE ORAL AT BEDTIME
Refills: 0 | Status: DISCONTINUED | OUTPATIENT
Start: 2023-11-23 | End: 2023-11-24

## 2023-11-23 RX ORDER — OMEPRAZOLE 10 MG/1
0 CAPSULE, DELAYED RELEASE ORAL
Qty: 0 | Refills: 0 | DISCHARGE

## 2023-11-23 RX ADMIN — SODIUM CHLORIDE 1000 MILLILITER(S): 9 INJECTION INTRAMUSCULAR; INTRAVENOUS; SUBCUTANEOUS at 15:13

## 2023-11-23 RX ADMIN — HEPARIN SODIUM 6000 UNIT(S): 5000 INJECTION INTRAVENOUS; SUBCUTANEOUS at 17:57

## 2023-11-23 RX ADMIN — HEPARIN SODIUM 1300 UNIT(S)/HR: 5000 INJECTION INTRAVENOUS; SUBCUTANEOUS at 17:57

## 2023-11-23 RX ADMIN — SODIUM CHLORIDE 1000 MILLILITER(S): 9 INJECTION INTRAMUSCULAR; INTRAVENOUS; SUBCUTANEOUS at 14:15

## 2023-11-23 NOTE — ED PROVIDER NOTE - CLINICAL SUMMARY MEDICAL DECISION MAKING FREE TEXT BOX
Here with leg pain and swelling as well as lightheadedness today that has resolved.  Check labs, get ultrasound, consider CT chest

## 2023-11-23 NOTE — H&P ADULT - HISTORY OF PRESENT ILLNESS
Pt is a 69 yo M  Pt is a 69 yo M w/ hx of kidney stones, HLD, GERD, presenting for calf swelling that started on Sunday 11/19. Pt states that he had traveled to [facility] to hike with his wife and his leg began feeling tight and swollen. Pt states that this had worsened, had tried to use advil metaxalone and ice packs, thinking it was just a leg sprain. Pt was taking metaxalone as needed for back pain but has not used it recently and had some 'left over'. Pt states this morning watching TV began feeling lightheaded even though swelling had been decreasing, prompting ED visit. Pt denies previous dvt or PE. Of note, pt had also gone golfing, hit his left leg on a piece of hard metal and had bruising that had not resolved until Sunday when his leg began swelling, and may have also attributed his sx to this. Pt currently feels tightness in his L calf. Pt currently denies fever, SOB, CP, n/v/d/c, CP, abd pain, RLE edema/pain.\    In the ED:  Vitals: T 98 HR 74 /85 RR 16 98% RA  Given: 1L NS Bolus, heparin 6000u IV 1x,   Labs: Plt 145 Trops -   L lower extremity doppler - DVT above and below the knee (femoral through popliteal vein)  CTA - b/l PE w/ no evidence of heart strain  EKG NSR 66bpm

## 2023-11-23 NOTE — H&P ADULT - NSHPREVIEWOFSYSTEMS_GEN_ALL_CORE
REVIEW OF SYSTEMS:    CONSTITUTIONAL: Denies weakness, fevers, or chills. Denies headaches. Denies dizziness or lightheadedness.  EYES/ENT: Denies vertigo or throat pain. Denies visual changes. Denies hearing changes.  NECK: Denies pain or stiffness.  RESPIRATORY: Denies cough, wheezing, hemoptysis. Denies shortness of breath.  CARDIOVASCULAR: Denies chest pain or palpitations.  GASTROINTESTINAL: Denies abdominal or epigastric pain. Denies nausea, vomiting, or hematemesis. Denies diarrhea or constipation. Denies melena or hematochezia.  GENITOURINARY: Denies dysuria, frequency, or hematuria.  EXTREMITIES: + tightness in L lower extremity surrounding calf. Denies pain or edema in RLE.   NEUROLOGICAL: Denies numbness. Denies any focal weakness. Denies any episode of syncope.  SKIN: Denies rashes, bruising.

## 2023-11-23 NOTE — H&P ADULT - PROBLEM SELECTOR PLAN 3
Chronic  - Pt had kidney stone removed early in Mar-May 2023 per pt.   - Continue home Flomax 0.4mg qd Chronic  - Pt had kidney stone removed early in Mar-May 2023 per pt.   - Continue home Flomax 0.4mg qd  - US arterial of kidney   - Hem Onc consulted, f/u recs Chronic  - Pt had kidney stone removed early in Mar-May 2023 per pt.   - Continue home Flomax 0.4mg qd  - US arterial of kidney??? f/u hem onc recs   - Hem Onc Dr. Ann consulted, f/u recs

## 2023-11-23 NOTE — H&P ADULT - PROBLEM SELECTOR PLAN 1
Pt w/ no hx of PE or DVT w/ leg swelling x4 days.  LLE doppler - DVT above and below the knee (femoral through popliteal vein)  CTA - b/l PE w/ no evidence of heart strain  EKG NSR 66bpm   - ED Vitals:  HR 74 /85 RR 16 98% RA  - IN ED Given: 1L NS Bolus, heparin 6000u IV 1x,   - ED Labs: Plt 145 Trops -   - Start heparin gtt  - Tylenol PRN for pain/discomfort  - f/u AM CBC/CMP  - Pulmarychuy Vasquez consulted, f/u recs

## 2023-11-23 NOTE — ED ADULT TRIAGE NOTE - IDEAL BODY WEIGHT(KG)
Patient Education        Counting Your Baby's Kicks: Care Instructions  Overview     Counting your baby's kicks is one way your doctor can tell that your baby is healthy. You will probably feel your baby move for the first time between 16 and 22 weeks. The movement may feel like flutters rather than kicks. Your baby may move more at certain times of the day. When you are active, you may notice less kicking than when you are resting. At your prenatal visits, your doctor will ask whether the baby is active. In your last trimester, your doctor may ask you to count the number of times you feel your baby move. Follow-up care is a key part of your treatment and safety. Be sure to make and go to all appointments, and call your doctor if you are having problems. It's also a good idea to know your test results and keep a list of the medicines you take. How do you count fetal kicks? A common method of checking your baby's movement is to note the length of time it takes to count 10 movements (such as kicks, flutters, or rolls). Pick your baby's most active time of day to count. This may be any time from morning to evening. If you don't feel 10 movements in an hour, have something to eat or drink and count for another hour. If you don't feel at least 10 movements in the 2-hour period, call your doctor. Do not use an at-home Doppler heart monitor in place of counting fetal movements. When should you call for help? Call your doctor now or seek immediate medical care if:    You feel fewer than 10 movements in a 2-hour period. You noticed that your baby has stopped moving or is moving less than normal.   Watch closely for changes in your health, and be sure to contact your doctor if you have any problems. Where can you learn more? Go to http://www.woods.com/ and enter U048 to learn more about \"Counting Your Baby's Kicks: Care Instructions. \"  Current as of: July 11, 2023               Content Version: 71

## 2023-11-23 NOTE — H&P ADULT - NSHPPHYSICALEXAM_GEN_ALL_CORE
T(C): 36.7 (11-23-23 @ 16:36), Max: 36.7 (11-23-23 @ 14:19)  HR: 76 (11-23-23 @ 16:36) (74 - 76)  BP: 148/68 (11-23-23 @ 16:36) (148/68 - 172/85)  RR: 18 (11-23-23 @ 16:36) (16 - 18)  SpO2: 98% (11-23-23 @ 16:36) (98% - 98%)    GENERAL: patient appears well, no acute distress, appropriately interactive  EYES: sclera clear, no exudates  ENMT: oropharynx clear without erythema, no exudates, moist mucous membranes  NECK: supple, soft  LUNGS: good air entry bilaterally, clear to auscultation, symmetric breath sounds, no wheezing or rhonchi appreciated  HEART: soft S1/S2, regular rate and rhythm, no murmurs noted,  GASTROINTESTINAL: abdomen is soft, nontender, nondistended, normoactive bowel sounds  INTEGUMENT: good skin turgor, warm skin, appears well perfused  MUSCULOSKELETAL: ++LLE calf mildly edematous and erythematous. ++LLE calf mildly TTP in popliteal region. RLE nontender, no erythema. no edema.   NEUROLOGIC: awake, alert, oriented x3  HEME/LYMPH: no obvious ecchymosis or petechiae

## 2023-11-23 NOTE — H&P ADULT - PROBLEM SELECTOR PLAN 2
Chronic  - Atorvastatin 20mg qhs, pt on rosuvastatin 2.5mg TIW  - AM lipid panel ordered, f/u results

## 2023-11-23 NOTE — ED PROVIDER NOTE - ATTENDING APP SHARED VISIT CONTRIBUTION OF CARE
This was a shared visit with JESSIE. I reviewed and verified the documentation and independently performed the documented MDM.

## 2023-11-23 NOTE — H&P ADULT - ASSESSMENT
Pt is a 69 yo M w/ hx of kidney stones, HLD, GERD, presenting for calf swelling x4 days and lightheadedness x1 day admitted for pulmonary embolism and DVT

## 2023-11-23 NOTE — ED ADULT NURSE NOTE - NSFALLUNIVINTERV_ED_ALL_ED
Bed/Stretcher in lowest position, wheels locked, appropriate side rails in place/Call bell, personal items and telephone in reach/Instruct patient to call for assistance before getting out of bed/chair/stretcher/Non-slip footwear applied when patient is off stretcher/[place] to call system/Physically safe environment - no spills, clutter or unnecessary equipment/Purposeful proactive rounding/Room/bathroom lighting operational, light cord in reach

## 2023-11-23 NOTE — ED PROVIDER NOTE - OBJECTIVE STATEMENT
63 M hx kidney stones, gerd, hld c/o lightheadedness x today, calf pain and swelling for the past few days. Was doing some walking, thought might be shin splint, has been taking nsaid and muscle relaxer, last dose today. However, started feeling lightheaded and became concerned for clot. Denies hx clot. Denies f/c, cp, sob.    pmd earnest

## 2023-11-23 NOTE — ED ADULT NURSE NOTE - MUSCLE PAIN OR WEAKNESS
Time Out Performed     Procedure: Insertion of 1 Lumen Midline    Indications: Long Term IV therapy    Procedure Details:  Order for Midline received and acknowledged, lab values,diagnostics and patient medical history were also reviewed.  Risks and benefits of Midline placement were discussed with patient/family including use of local anesthetic, risks of thrombosis, bleeding, infection, tissue damage, and possible arterial puncture.  Informed consent was obtained for placement of Midline.  Stop sign was placed on patient door prior to procedure.  There was/were  0 additional person(evangelina) present during procedure who also donned gloves, hat, and mask after performing hand hygiene.      # 3 FR,   1 Lumen Midline inserted in the right brachial veinvein with 1 poke(s) using maximum sterile technique including chloraprep, cap, gown, gloves, hand hygiene, mask and drape per hospital protocol.   Midline secured using a stabilization device, biopatch placed over insertion site followed by a sterile dressing.  Mid upper arm circumference is 47 cm.  Brisk blood return noted to all port(s) and catheter was flushed with 20 cc cc NS.  There were no changes in vital signs throughout procedure and patient did tolerate procedure well.    LOT # [id number]    Recommendations:  Placement verification x-ray not required for Midlines.  Midline Brochure given to patient with teaching instructions.  Helene BROWN instructed line is ready for use.   no

## 2023-11-23 NOTE — H&P ADULT - ATTENDING COMMENTS
Hospitalist attending  I have personally seen and examined the patient.  I fully participated in the care of this patient.  I have made amendments to the documentation where necessary, and agree with the history, physical exam, and plan as documented by the resident.    Vital Signs Last 24 Hrs  T(F): 98 (23 Nov 2023 16:36), Max: 98 (23 Nov 2023 14:19)  HR: 76 (23 Nov 2023 16:36) (74 - 76)  BP: 148/68 (23 Nov 2023 16:36) (148/68 - 172/85)  RR: 18 (23 Nov 2023 16:36) (16 - 18)  SpO2: 98% (23 Nov 2023 16:36) (98% - 98%)    Physical Exam:  GENERAL: patient appears well, no acute distress, appropriately interactive  LUNGS: good air entry bilaterally, clear to auscultation, symmetric breath sounds, no wheezing or rhonchi appreciated  HEART: soft S1/S2, regular rate and rhythm, no murmurs noted,  GASTROINTESTINAL: abdomen is soft, nontender, nondistended, normoactive bowel sounds  INTEGUMENT: good skin turgor, warm skin, appears well perfused  MUSCULOSKELETAL: ++LLE calf mildly edematous and erythematous. ++LLE calf mildly TTP in popliteal region. RLE nontender, no erythema. no edema.   NEUROLOGIC: awake, alert, oriented x3    Pt is a 67 yo M w/ hx of kidney stones, HLD, GERD, presenting for calf swelling x4 days and lightheadedness x1 day admitted for pulmonary embolism and DVT.    #PE and DVT- will c/w heparin gtt. Will consult pulm and heme/onc. F/u TTE  #Nephrolithiasis- Patient with kidney stone removal earlier this year. Will obtain US kidney with dopplers Hospitalist attending  I have personally seen and examined the patient.  I fully participated in the care of this patient.  I have made amendments to the documentation where necessary, and agree with the history, physical exam, and plan as documented by the resident.    Vital Signs Last 24 Hrs  T(F): 98 (23 Nov 2023 16:36), Max: 98 (23 Nov 2023 14:19)  HR: 76 (23 Nov 2023 16:36) (74 - 76)  BP: 148/68 (23 Nov 2023 16:36) (148/68 - 172/85)  RR: 18 (23 Nov 2023 16:36) (16 - 18)  SpO2: 98% (23 Nov 2023 16:36) (98% - 98%)    Physical Exam:  GENERAL: patient appears well, no acute distress, appropriately interactive  LUNGS: good air entry bilaterally, clear to auscultation, symmetric breath sounds, no wheezing or rhonchi appreciated  HEART: soft S1/S2, regular rate and rhythm, no murmurs noted,  GASTROINTESTINAL: abdomen is soft, nontender, nondistended, normoactive bowel sounds  INTEGUMENT: good skin turgor, warm skin, appears well perfused  MUSCULOSKELETAL: ++LLE calf mildly edematous and erythematous. ++LLE calf mildly TTP in popliteal region. RLE nontender, no erythema. no edema.   NEUROLOGIC: awake, alert, oriented x3    Pt is a 69 yo M w/ hx of kidney stones, HLD, GERD, presenting for calf swelling x4 days and lightheadedness x1 day admitted for pulmonary embolism and DVT.    #PE and DVT- will c/w heparin gtt. Will consult pulm and heme/onc. F/u TTE  #Nephrolithiasis- Patient with kidney stone removal earlier this year. Will obtain US kidney.

## 2023-11-23 NOTE — PATIENT PROFILE ADULT - FALL HARM RISK - HARM RISK INTERVENTIONS

## 2023-11-24 ENCOUNTER — TRANSCRIPTION ENCOUNTER (OUTPATIENT)
Age: 63
End: 2023-11-24

## 2023-11-24 VITALS
SYSTOLIC BLOOD PRESSURE: 131 MMHG | DIASTOLIC BLOOD PRESSURE: 70 MMHG | TEMPERATURE: 97 F | RESPIRATION RATE: 18 BRPM | OXYGEN SATURATION: 94 % | HEART RATE: 74 BPM

## 2023-11-24 LAB
ALBUMIN SERPL ELPH-MCNC: 3.7 G/DL — SIGNIFICANT CHANGE UP (ref 3.3–5)
ALBUMIN SERPL ELPH-MCNC: 3.7 G/DL — SIGNIFICANT CHANGE UP (ref 3.3–5)
ALP SERPL-CCNC: 74 U/L — SIGNIFICANT CHANGE UP (ref 40–120)
ALP SERPL-CCNC: 74 U/L — SIGNIFICANT CHANGE UP (ref 40–120)
ALT FLD-CCNC: 27 U/L — SIGNIFICANT CHANGE UP (ref 12–78)
ALT FLD-CCNC: 27 U/L — SIGNIFICANT CHANGE UP (ref 12–78)
ANION GAP SERPL CALC-SCNC: 7 MMOL/L — SIGNIFICANT CHANGE UP (ref 5–17)
ANION GAP SERPL CALC-SCNC: 7 MMOL/L — SIGNIFICANT CHANGE UP (ref 5–17)
APTT BLD: 113.8 SEC — HIGH (ref 24.5–35.6)
APTT BLD: 113.8 SEC — HIGH (ref 24.5–35.6)
APTT BLD: 81.5 SEC — HIGH (ref 24.5–35.6)
APTT BLD: 81.5 SEC — HIGH (ref 24.5–35.6)
AST SERPL-CCNC: 24 U/L — SIGNIFICANT CHANGE UP (ref 15–37)
AST SERPL-CCNC: 24 U/L — SIGNIFICANT CHANGE UP (ref 15–37)
BASOPHILS # BLD AUTO: 0.04 K/UL — SIGNIFICANT CHANGE UP (ref 0–0.2)
BASOPHILS # BLD AUTO: 0.04 K/UL — SIGNIFICANT CHANGE UP (ref 0–0.2)
BASOPHILS NFR BLD AUTO: 0.6 % — SIGNIFICANT CHANGE UP (ref 0–2)
BASOPHILS NFR BLD AUTO: 0.6 % — SIGNIFICANT CHANGE UP (ref 0–2)
BILIRUB SERPL-MCNC: 1 MG/DL — SIGNIFICANT CHANGE UP (ref 0.2–1.2)
BILIRUB SERPL-MCNC: 1 MG/DL — SIGNIFICANT CHANGE UP (ref 0.2–1.2)
BUN SERPL-MCNC: 13 MG/DL — SIGNIFICANT CHANGE UP (ref 7–23)
BUN SERPL-MCNC: 13 MG/DL — SIGNIFICANT CHANGE UP (ref 7–23)
CALCIUM SERPL-MCNC: 9.1 MG/DL — SIGNIFICANT CHANGE UP (ref 8.5–10.1)
CALCIUM SERPL-MCNC: 9.1 MG/DL — SIGNIFICANT CHANGE UP (ref 8.5–10.1)
CHLORIDE SERPL-SCNC: 108 MMOL/L — SIGNIFICANT CHANGE UP (ref 96–108)
CHLORIDE SERPL-SCNC: 108 MMOL/L — SIGNIFICANT CHANGE UP (ref 96–108)
CHOLEST SERPL-MCNC: 179 MG/DL — SIGNIFICANT CHANGE UP
CHOLEST SERPL-MCNC: 179 MG/DL — SIGNIFICANT CHANGE UP
CO2 SERPL-SCNC: 27 MMOL/L — SIGNIFICANT CHANGE UP (ref 22–31)
CO2 SERPL-SCNC: 27 MMOL/L — SIGNIFICANT CHANGE UP (ref 22–31)
CREAT SERPL-MCNC: 1.1 MG/DL — SIGNIFICANT CHANGE UP (ref 0.5–1.3)
CREAT SERPL-MCNC: 1.1 MG/DL — SIGNIFICANT CHANGE UP (ref 0.5–1.3)
EGFR: 75 ML/MIN/1.73M2 — SIGNIFICANT CHANGE UP
EGFR: 75 ML/MIN/1.73M2 — SIGNIFICANT CHANGE UP
EOSINOPHIL # BLD AUTO: 0.31 K/UL — SIGNIFICANT CHANGE UP (ref 0–0.5)
EOSINOPHIL # BLD AUTO: 0.31 K/UL — SIGNIFICANT CHANGE UP (ref 0–0.5)
EOSINOPHIL NFR BLD AUTO: 4.5 % — SIGNIFICANT CHANGE UP (ref 0–6)
EOSINOPHIL NFR BLD AUTO: 4.5 % — SIGNIFICANT CHANGE UP (ref 0–6)
GLUCOSE SERPL-MCNC: 147 MG/DL — HIGH (ref 70–99)
GLUCOSE SERPL-MCNC: 147 MG/DL — HIGH (ref 70–99)
HCT VFR BLD CALC: 36.3 % — LOW (ref 39–50)
HCT VFR BLD CALC: 36.3 % — LOW (ref 39–50)
HCT VFR BLD CALC: 40.7 % — SIGNIFICANT CHANGE UP (ref 39–50)
HCT VFR BLD CALC: 40.7 % — SIGNIFICANT CHANGE UP (ref 39–50)
HCV AB S/CO SERPL IA: 0.14 S/CO — SIGNIFICANT CHANGE UP (ref 0–0.99)
HCV AB S/CO SERPL IA: 0.14 S/CO — SIGNIFICANT CHANGE UP (ref 0–0.99)
HCV AB SERPL-IMP: SIGNIFICANT CHANGE UP
HCV AB SERPL-IMP: SIGNIFICANT CHANGE UP
HDLC SERPL-MCNC: 68 MG/DL — SIGNIFICANT CHANGE UP
HDLC SERPL-MCNC: 68 MG/DL — SIGNIFICANT CHANGE UP
HGB BLD-MCNC: 12.5 G/DL — LOW (ref 13–17)
HGB BLD-MCNC: 12.5 G/DL — LOW (ref 13–17)
HGB BLD-MCNC: 14 G/DL — SIGNIFICANT CHANGE UP (ref 13–17)
HGB BLD-MCNC: 14 G/DL — SIGNIFICANT CHANGE UP (ref 13–17)
IMM GRANULOCYTES NFR BLD AUTO: 1 % — HIGH (ref 0–0.9)
IMM GRANULOCYTES NFR BLD AUTO: 1 % — HIGH (ref 0–0.9)
LIPID PNL WITH DIRECT LDL SERPL: 91 MG/DL — SIGNIFICANT CHANGE UP
LIPID PNL WITH DIRECT LDL SERPL: 91 MG/DL — SIGNIFICANT CHANGE UP
LYMPHOCYTES # BLD AUTO: 1.6 K/UL — SIGNIFICANT CHANGE UP (ref 1–3.3)
LYMPHOCYTES # BLD AUTO: 1.6 K/UL — SIGNIFICANT CHANGE UP (ref 1–3.3)
LYMPHOCYTES # BLD AUTO: 23.4 % — SIGNIFICANT CHANGE UP (ref 13–44)
LYMPHOCYTES # BLD AUTO: 23.4 % — SIGNIFICANT CHANGE UP (ref 13–44)
MCHC RBC-ENTMCNC: 30.1 PG — SIGNIFICANT CHANGE UP (ref 27–34)
MCHC RBC-ENTMCNC: 30.1 PG — SIGNIFICANT CHANGE UP (ref 27–34)
MCHC RBC-ENTMCNC: 30.3 PG — SIGNIFICANT CHANGE UP (ref 27–34)
MCHC RBC-ENTMCNC: 30.3 PG — SIGNIFICANT CHANGE UP (ref 27–34)
MCHC RBC-ENTMCNC: 34.4 GM/DL — SIGNIFICANT CHANGE UP (ref 32–36)
MCV RBC AUTO: 87.5 FL — SIGNIFICANT CHANGE UP (ref 80–100)
MCV RBC AUTO: 87.5 FL — SIGNIFICANT CHANGE UP (ref 80–100)
MCV RBC AUTO: 88.1 FL — SIGNIFICANT CHANGE UP (ref 80–100)
MCV RBC AUTO: 88.1 FL — SIGNIFICANT CHANGE UP (ref 80–100)
MONOCYTES # BLD AUTO: 0.65 K/UL — SIGNIFICANT CHANGE UP (ref 0–0.9)
MONOCYTES # BLD AUTO: 0.65 K/UL — SIGNIFICANT CHANGE UP (ref 0–0.9)
MONOCYTES NFR BLD AUTO: 9.5 % — SIGNIFICANT CHANGE UP (ref 2–14)
MONOCYTES NFR BLD AUTO: 9.5 % — SIGNIFICANT CHANGE UP (ref 2–14)
NEUTROPHILS # BLD AUTO: 4.17 K/UL — SIGNIFICANT CHANGE UP (ref 1.8–7.4)
NEUTROPHILS # BLD AUTO: 4.17 K/UL — SIGNIFICANT CHANGE UP (ref 1.8–7.4)
NEUTROPHILS NFR BLD AUTO: 61 % — SIGNIFICANT CHANGE UP (ref 43–77)
NEUTROPHILS NFR BLD AUTO: 61 % — SIGNIFICANT CHANGE UP (ref 43–77)
NON HDL CHOLESTEROL: 110 MG/DL — SIGNIFICANT CHANGE UP
NON HDL CHOLESTEROL: 110 MG/DL — SIGNIFICANT CHANGE UP
NRBC # BLD: 0 /100 WBCS — SIGNIFICANT CHANGE UP (ref 0–0)
PLATELET # BLD AUTO: 141 K/UL — LOW (ref 150–400)
PLATELET # BLD AUTO: 141 K/UL — LOW (ref 150–400)
PLATELET # BLD AUTO: 148 K/UL — LOW (ref 150–400)
PLATELET # BLD AUTO: 148 K/UL — LOW (ref 150–400)
POTASSIUM SERPL-MCNC: 3.7 MMOL/L — SIGNIFICANT CHANGE UP (ref 3.5–5.3)
POTASSIUM SERPL-MCNC: 3.7 MMOL/L — SIGNIFICANT CHANGE UP (ref 3.5–5.3)
POTASSIUM SERPL-SCNC: 3.7 MMOL/L — SIGNIFICANT CHANGE UP (ref 3.5–5.3)
POTASSIUM SERPL-SCNC: 3.7 MMOL/L — SIGNIFICANT CHANGE UP (ref 3.5–5.3)
PROT SERPL-MCNC: 7.5 G/DL — SIGNIFICANT CHANGE UP (ref 6–8.3)
PROT SERPL-MCNC: 7.5 G/DL — SIGNIFICANT CHANGE UP (ref 6–8.3)
RBC # BLD: 4.12 M/UL — LOW (ref 4.2–5.8)
RBC # BLD: 4.12 M/UL — LOW (ref 4.2–5.8)
RBC # BLD: 4.65 M/UL — SIGNIFICANT CHANGE UP (ref 4.2–5.8)
RBC # BLD: 4.65 M/UL — SIGNIFICANT CHANGE UP (ref 4.2–5.8)
RBC # FLD: 13 % — SIGNIFICANT CHANGE UP (ref 10.3–14.5)
SODIUM SERPL-SCNC: 142 MMOL/L — SIGNIFICANT CHANGE UP (ref 135–145)
SODIUM SERPL-SCNC: 142 MMOL/L — SIGNIFICANT CHANGE UP (ref 135–145)
TRIGL SERPL-MCNC: 108 MG/DL — SIGNIFICANT CHANGE UP
TRIGL SERPL-MCNC: 108 MG/DL — SIGNIFICANT CHANGE UP
WBC # BLD: 6.84 K/UL — SIGNIFICANT CHANGE UP (ref 3.8–10.5)
WBC # BLD: 6.84 K/UL — SIGNIFICANT CHANGE UP (ref 3.8–10.5)
WBC # BLD: 8.04 K/UL — SIGNIFICANT CHANGE UP (ref 3.8–10.5)
WBC # BLD: 8.04 K/UL — SIGNIFICANT CHANGE UP (ref 3.8–10.5)
WBC # FLD AUTO: 6.84 K/UL — SIGNIFICANT CHANGE UP (ref 3.8–10.5)
WBC # FLD AUTO: 6.84 K/UL — SIGNIFICANT CHANGE UP (ref 3.8–10.5)
WBC # FLD AUTO: 8.04 K/UL — SIGNIFICANT CHANGE UP (ref 3.8–10.5)
WBC # FLD AUTO: 8.04 K/UL — SIGNIFICANT CHANGE UP (ref 3.8–10.5)

## 2023-11-24 PROCEDURE — 85730 THROMBOPLASTIN TIME PARTIAL: CPT

## 2023-11-24 PROCEDURE — 80053 COMPREHEN METABOLIC PANEL: CPT

## 2023-11-24 PROCEDURE — 80061 LIPID PANEL: CPT

## 2023-11-24 PROCEDURE — 83880 ASSAY OF NATRIURETIC PEPTIDE: CPT

## 2023-11-24 PROCEDURE — 71275 CT ANGIOGRAPHY CHEST: CPT | Mod: MA

## 2023-11-24 PROCEDURE — 93306 TTE W/DOPPLER COMPLETE: CPT | Mod: 26

## 2023-11-24 PROCEDURE — 86803 HEPATITIS C AB TEST: CPT

## 2023-11-24 PROCEDURE — 85027 COMPLETE CBC AUTOMATED: CPT

## 2023-11-24 PROCEDURE — 99239 HOSP IP/OBS DSCHRG MGMT >30: CPT | Mod: GC

## 2023-11-24 PROCEDURE — 99285 EMERGENCY DEPT VISIT HI MDM: CPT | Mod: 25

## 2023-11-24 PROCEDURE — 93306 TTE W/DOPPLER COMPLETE: CPT

## 2023-11-24 PROCEDURE — 93005 ELECTROCARDIOGRAM TRACING: CPT

## 2023-11-24 PROCEDURE — 93971 EXTREMITY STUDY: CPT

## 2023-11-24 PROCEDURE — 99222 1ST HOSP IP/OBS MODERATE 55: CPT | Mod: 25

## 2023-11-24 PROCEDURE — 85025 COMPLETE CBC W/AUTO DIFF WBC: CPT

## 2023-11-24 PROCEDURE — 36415 COLL VENOUS BLD VENIPUNCTURE: CPT

## 2023-11-24 PROCEDURE — 84484 ASSAY OF TROPONIN QUANT: CPT

## 2023-11-24 PROCEDURE — 85610 PROTHROMBIN TIME: CPT

## 2023-11-24 RX ORDER — ROSUVASTATIN CALCIUM 5 MG/1
2.5 TABLET ORAL
Refills: 0 | Status: DISCONTINUED | OUTPATIENT
Start: 2023-11-24 | End: 2023-11-24

## 2023-11-24 RX ORDER — OMEPRAZOLE 10 MG/1
20 CAPSULE, DELAYED RELEASE ORAL
Refills: 0 | Status: DISCONTINUED | OUTPATIENT
Start: 2023-11-24 | End: 2023-11-24

## 2023-11-24 RX ORDER — APIXABAN 2.5 MG/1
10 TABLET, FILM COATED ORAL EVERY 12 HOURS
Refills: 0 | Status: DISCONTINUED | OUTPATIENT
Start: 2023-11-24 | End: 2023-11-24

## 2023-11-24 RX ORDER — POTASSIUM CITRATE MONOHYDRATE 100 %
5 POWDER (GRAM) MISCELLANEOUS
Refills: 0 | Status: DISCONTINUED | OUTPATIENT
Start: 2023-11-24 | End: 2023-11-24

## 2023-11-24 RX ORDER — APIXABAN 2.5 MG/1
2 TABLET, FILM COATED ORAL
Qty: 75 | Refills: 0
Start: 2023-11-24

## 2023-11-24 RX ADMIN — HEPARIN SODIUM 1100 UNIT(S)/HR: 5000 INJECTION INTRAVENOUS; SUBCUTANEOUS at 11:02

## 2023-11-24 RX ADMIN — HEPARIN SODIUM 1100 UNIT(S)/HR: 5000 INJECTION INTRAVENOUS; SUBCUTANEOUS at 00:48

## 2023-11-24 RX ADMIN — OMEPRAZOLE 20 MILLIGRAM(S): 10 CAPSULE, DELAYED RELEASE ORAL at 11:02

## 2023-11-24 RX ADMIN — HEPARIN SODIUM 1100 UNIT(S)/HR: 5000 INJECTION INTRAVENOUS; SUBCUTANEOUS at 07:06

## 2023-11-24 RX ADMIN — HEPARIN SODIUM 1100 UNIT(S)/HR: 5000 INJECTION INTRAVENOUS; SUBCUTANEOUS at 16:11

## 2023-11-24 RX ADMIN — Medication 5 MILLIEQUIVALENT(S): at 11:01

## 2023-11-24 RX ADMIN — APIXABAN 10 MILLIGRAM(S): 2.5 TABLET, FILM COATED ORAL at 18:18

## 2023-11-24 NOTE — CARE COORDINATION ASSESSMENT. - NSPASTMEDSURGHISTORY_GEN_ALL_CORE_FT
PAST MEDICAL & SURGICAL HISTORY:  Calculus of ureter      GERD (gastroesophageal reflux disease)      HLD (hyperlipidemia)      H/O hernia repair

## 2023-11-24 NOTE — DISCHARGE NOTE PROVIDER - NSDCCPCAREPLAN_GEN_ALL_CORE_FT
PRINCIPAL DISCHARGE DIAGNOSIS  Diagnosis: Pulmonary embolism  Assessment and Plan of Treatment: You were found to have clots in your lungs and your left leg. Please take the prescribed apixaban (aka Eliquis) to prevent spread of the clots. The first week is a loading dose week which is going to be double the dose you will be taking after you complete that week.  Please take 2 tabs by mouth twice a day for 7 days, then DECREASE dose to 1 tab by mouth twice a day starting evening dose of 12/1/23.  Follow up with hematologist, Dr. Gomez (number below), within a couple of weeks of discharge for reassessment and in order to obtain prescription for more of the Eliquis. Hematologist may also perform a hypercoagulable work-up to see if you have risk factors for developing a blood clot.  If you have worsening shortness of breath, chest pain, dizziness, or other symptoms that concern you, come back to the ER.  Monitor your stool. If you have black stool or red bloody stool, come back to the ER.   Please avoid taking NSAIDS (medications such as ibuprofen, Advil, Aleve, naproxen, Motrin, etc.) as those medications can increase chance of stomach ulceration, which can bleed and that bleed would be exacerbated by the blood thinners you need to be on. You may take Tylenol or acetaminophen, if needed, per recommendations listed on the box.  Follow up with your PCP.  Avoid over-exertion / vigorous exercise until follow-up with your physician and receiving clearance for more exertion. You may continue to walk and performe light activities if you do not feel symptoms like shortness of breath / dizziness / chest pain while doing those activities.      SECONDARY DISCHARGE DIAGNOSES  Diagnosis: Deep vein thrombosis (DVT)  Assessment and Plan of Treatment: You have a DVT (blood clot) in deep veins of your left leg.   Take the blood thinner and follow instructions as described above.

## 2023-11-24 NOTE — CONSULT NOTE ADULT - ASSESSMENT
64 yo M with PMHx of HTN, HLD. GERD, presenting with L calf swelling and lightheadedness. Admitted for PE and DVT    PE, DVT, HTN, HLD  - CTA showed b/l PE  - US duplex LLE with acute deep venous thrombosis: above and below the knee. extending from the femoral vein through the   popliteal and calf veins.  - continue heparin gtt for full AC  - monitor on telemetry    - EKG NSR without ischemic changes  - troponin neg x1  - Denies anginal complaints    -Euvolemic on exam  - TTE pending    - Monitor and replete lytes, keep K>4, Mg>2.  - Will continue to follow.    Berto Perez NP  Nurse Practitioner- Cardiology   Call TEAMS   62 yo M with PMHx of HTN, HLD. GERD, presenting with L calf swelling and lightheadedness. Admitted for PE and DVT    PE, DVT, HTN, HLD  - CTA showed b/l PE  - US duplex LLE with acute deep venous thrombosis: above and below the knee. extending from the femoral vein through the   popliteal and calf veins.  - continue heparin gtt for full AC, can eventually transition to DOAC  - monitor on telemetry  - heme following    - EKG NSR without ischemic changes  - troponin neg x1  - Denies anginal complaints    -Euvolemic on exam  - TTE pending    -Bp controlled  - Monitor and replete lytes, keep K>4, Mg>2.  - Will continue to follow.    Berto Perez NP  Nurse Practitioner- Cardiology   Call TEAMS

## 2023-11-24 NOTE — CONSULT NOTE ADULT - SUBJECTIVE AND OBJECTIVE BOX
Date/Time Patient Seen:  		  Referring MD:   Data Reviewed	       Patient is a 63y old  Male who presents with a chief complaint of b/l PE, DVT (23 Nov 2023 17:34)      Subjective/HPI   Pt is a 69 yo M w/ hx of kidney stones, HLD, GERD, presenting for calf swelling that started on Sunday 11/19. Pt states that he had traveled to [facility] to hike with his wife and his leg began feeling tight and swollen. Pt states that this had worsened, had tried to use advil metaxalone and ice packs, thinking it was just a leg sprain. Pt was taking metaxalone as needed for back pain but has not used it recently and had some 'left over'. Pt states this morning watching TV began feeling lightheaded even though swelling had been decreasing, prompting ED visit. Pt denies previous dvt or PE. Of note, pt had also gone golfing, hit his left leg on a piece of hard metal and had bruising that had not resolved until Sunday when his leg began swelling, and may have also attributed his sx to this. Pt currently feels tightness in his L calf. Pt currently denies fever, SOB, CP, n/v/d/c, CP, abd pain, RLE edema/pain.\    PAST MEDICAL & SURGICAL HISTORY:  No pertinent past medical history    HLD (hyperlipidemia)    GERD (gastroesophageal reflux disease)    Calculus of ureter    No significant past surgical history    H/O hernia repair    PAST SURGICAL HISTORY:  H/O hernia repair.     FAMILY HISTORY:  Mother  Still living? No  FHx: cancer of digestive organ, Age at diagnosis: Age Unknown.     Social History:  · Substance use	No  · Social History (marital status, living situation, occupation, and sexual history)	Patient lives with wife at home.   Able to do ADLs independently  Able to walk w/o assistance.     Tobacco Screening:  · Core Measure Site	Yes  · Has the patient used tobacco in the past 30 days?	No    Risk Assessment:    Present on Admission:  Deep Venous Thrombosis	yes  DVT Confirmed	Thrombophlebitis of Femoropopliteal vein  Pulmonary Embolus	yes     HIV Screening:  · In accordance with [place] State law, we offer every patient who comes to our ED an HIV test. Would you like to be tested today?	Opt out        Medication list         MEDICATIONS  (STANDING):  atorvastatin 20 milliGRAM(s) Oral at bedtime  heparin  Infusion.  Unit(s)/Hr (13 mL/Hr) IV Continuous <Continuous>  pantoprazole    Tablet 40 milliGRAM(s) Oral before breakfast  tamsulosin 0.4 milliGRAM(s) Oral at bedtime    MEDICATIONS  (PRN):  acetaminophen     Tablet .. 650 milliGRAM(s) Oral every 6 hours PRN Temp greater or equal to 38C (100.4F), Mild Pain (1 - 3)  aluminum hydroxide/magnesium hydroxide/simethicone Suspension 30 milliLiter(s) Oral every 4 hours PRN Dyspepsia  heparin   Injectable 3000 Unit(s) IV Push every 6 hours PRN For aPTT between 40 - 57  heparin   Injectable 6000 Unit(s) IV Push every 6 hours PRN For aPTT less than 40  melatonin 3 milliGRAM(s) Oral at bedtime PRN Insomnia  ondansetron Injectable 4 milliGRAM(s) IV Push every 8 hours PRN Nausea and/or Vomiting         Vitals log        ICU Vital Signs Last 24 Hrs  T(C): 37 (24 Nov 2023 05:03), Max: 37 (24 Nov 2023 05:03)  T(F): 98.6 (24 Nov 2023 05:03), Max: 98.6 (24 Nov 2023 05:03)  HR: 77 (24 Nov 2023 05:03) (74 - 78)  BP: 148/84 (24 Nov 2023 05:03) (134/76 - 172/85)  BP(mean): --  ABP: --  ABP(mean): --  RR: 18 (24 Nov 2023 05:03) (16 - 18)  SpO2: 97% (24 Nov 2023 05:03) (96% - 98%)    O2 Parameters below as of 24 Nov 2023 05:03  Patient On (Oxygen Delivery Method): room air                 Input and Output:  I&O's Detail    23 Nov 2023 07:01  -  24 Nov 2023 05:26  --------------------------------------------------------  IN:  Total IN: 0 mL    OUT:    Voided (mL): 3 mL  Total OUT: 3 mL    Total NET: -3 mL          Lab Data                        12.5   8.04  )-----------( 141      ( 24 Nov 2023 00:02 )             36.3     11-23    141  |  106  |  15  ----------------------------<  110<H>  4.4   |  30  |  1.00    Ca    9.2      23 Nov 2023 15:00    TPro  7.6  /  Alb  3.8  /  TBili  0.8  /  DBili  x   /  AST  20  /  ALT  27  /  AlkPhos  70  11-23            Review of Systems	      Objective     Physical Examination        Pertinent Lab findings & Imaging      Hernández:  NO   Adequate UO     I&O's Detail    23 Nov 2023 07:01  -  24 Nov 2023 05:26  --------------------------------------------------------  IN:  Total IN: 0 mL    OUT:    Voided (mL): 3 mL  Total OUT: 3 mL    Total NET: -3 mL               Discussed with:     Cultures:	        Radiology      ACC: [id number] EXAM:  US DPLX LWR EXT VEINS LTD    ORDERED BY: HAROON SHARMA     PROCEDURE DATE:  11/23/2023          INTERPRETATION:  CLINICAL INFORMATION: Lower extremity swelling.    COMPARISON: None available.    TECHNIQUE: Duplex sonography of the LEFT LOWER extremity veins with color   and spectral Doppler, with and without compression.    FINDINGS:    Acute deep venous thrombosis extending from the proximal femoral vein   through the popliteal vein.    Deep venous thrombosis extends into the posterior tibial and peroneal   veins.    The contralateral common femoral vein is patent.    IMPRESSION:  Acute deep venous thrombosis: above and below the knee.    Acute deep venous thrombosis extending from the femoral vein through the   popliteal and calf veins.    Findings were discussed with ELISE SHARMA  11/23/2023 4:01 PM by   Dr. MCKINLEY with read back confirmation.    --- End of Report ---            RODRÍGUEZ MCKINLEY MD; Attending Radiologist  This document has been electronically signed. Nov 23 2023  4:02PM  Abnormal.          ACC: [id number] EXAM:  CT ANGIO CHEST PULM ART [facility]   ORDERED BY: HAROON SHARMA     PROCEDURE DATE:  11/23/2023          INTERPRETATION:  CTA CHEST    INDICATION: History of left calf pain for 2 days and dizziness.    TECHNIQUE: Enhanced helical images were obtained of the chest. Coronal   and sagittal images were reconstructed Maximum intensity projection   images were generated.    Omnipaque 350 70 cc administered 30 cc discarded    COMPARISON: Chest radiograph 5/15/2013.    FINDINGS:    Pulmonary Artery:  Bilateral pulmonary emboli involving the segmental   arteries of the left upper lobe, left lower lobar artery, the segmental   arteries of the left lower lobe, the segmental arteries of the right   upper lobe, right middle lobe, and right lower lobe.    Tubes/Lines: None.    Lungs, airways and pleura: Lungs are clear. The airways and pleura are   normal.    Mediastinum: The heart is normal in size. No pericardial effusion.   Left-sided aortic arch and left-sided descending thoracic aorta. Aorta is   normal in caliber.    There are no enlarged chest lymph nodes. The visualized thyroid gland is   normal. The esophagus is unremarkable except for a hiatal hernia.    Upper Abdomen: The upper abdomen is unremarkable.    Bones And Soft Tissues: The bones are unremarkable.  The soft tissues are   unremarkable.      IMPRESSION:    1.  Bilateral pulmonary emboli.  2.  The heart is normal in size.  3.  The findings were discussed with read back verification obtained from   Dr. Tolbert on 11/23/2023 at 1704 hours.    --- End of Report ---            FREDERICK DENNIS MD; Attending Radiologist  This document has been electronically signed. Nov 23 2023  5:05PM                 Date/Time Patient Seen:  		  Referring MD:   Data Reviewed	       Patient is a 63y old  Male who presents with a chief complaint of b/l PE, DVT (23 Nov 2023 17:34)      Subjective/HPI   Pt is a 69 yo M w/ hx of kidney stones, HLD, GERD, presenting for calf swelling that started on Sunday 11/19. Pt states that he had traveled to [facility] to hike with his wife and his leg began feeling tight and swollen. Pt states that this had worsened, had tried to use advil metaxalone and ice packs, thinking it was just a leg sprain. Pt was taking metaxalone as needed for back pain but has not used it recently and had some 'left over'. Pt states this morning watching TV began feeling lightheaded even though swelling had been decreasing, prompting ED visit. Pt denies previous dvt or PE. Of note, pt had also gone golfing, hit his left leg on a piece of hard metal and had bruising that had not resolved until Sunday when his leg began swelling, and may have also attributed his sx to this. Pt currently feels tightness in his L calf. Pt currently denies fever, SOB, CP, n/v/d/c, CP, abd pain, RLE edema/pain.\    PAST MEDICAL & SURGICAL HISTORY:  No pertinent past medical history    HLD (hyperlipidemia)    GERD (gastroesophageal reflux disease)    Calculus of ureter    No significant past surgical history    H/O hernia repair    PAST SURGICAL HISTORY:  H/O hernia repair.     FAMILY HISTORY:  Mother  Still living? No  FHx: cancer of digestive organ, Age at diagnosis: Age Unknown.     Social History:  · Substance use	No  · Social History (marital status, living situation, occupation, and sexual history)	Patient lives with wife at home.   Able to do ADLs independently  Able to walk w/o assistance.     Tobacco Screening:  · Core Measure Site	Yes  · Has the patient used tobacco in the past 30 days?	No    Risk Assessment:    Present on Admission:  Deep Venous Thrombosis	yes  DVT Confirmed	Thrombophlebitis of Femoropopliteal vein  Pulmonary Embolus	yes     HIV Screening:  · In accordance with [place] law, we offer every patient who comes to our ED an HIV test. Would you like to be tested today?	Opt out        Medication list         MEDICATIONS  (STANDING):  atorvastatin 20 milliGRAM(s) Oral at bedtime  heparin  Infusion.  Unit(s)/Hr (13 mL/Hr) IV Continuous <Continuous>  pantoprazole    Tablet 40 milliGRAM(s) Oral before breakfast  tamsulosin 0.4 milliGRAM(s) Oral at bedtime    MEDICATIONS  (PRN):  acetaminophen     Tablet .. 650 milliGRAM(s) Oral every 6 hours PRN Temp greater or equal to 38C (100.4F), Mild Pain (1 - 3)  aluminum hydroxide/magnesium hydroxide/simethicone Suspension 30 milliLiter(s) Oral every 4 hours PRN Dyspepsia  heparin   Injectable 3000 Unit(s) IV Push every 6 hours PRN For aPTT between 40 - 57  heparin   Injectable 6000 Unit(s) IV Push every 6 hours PRN For aPTT less than 40  melatonin 3 milliGRAM(s) Oral at bedtime PRN Insomnia  ondansetron Injectable 4 milliGRAM(s) IV Push every 8 hours PRN Nausea and/or Vomiting         Vitals log        ICU Vital Signs Last 24 Hrs  T(C): 37 (24 Nov 2023 05:03), Max: 37 (24 Nov 2023 05:03)  T(F): 98.6 (24 Nov 2023 05:03), Max: 98.6 (24 Nov 2023 05:03)  HR: 77 (24 Nov 2023 05:03) (74 - 78)  BP: 148/84 (24 Nov 2023 05:03) (134/76 - 172/85)  BP(mean): --  ABP: --  ABP(mean): --  RR: 18 (24 Nov 2023 05:03) (16 - 18)  SpO2: 97% (24 Nov 2023 05:03) (96% - 98%)    O2 Parameters below as of 24 Nov 2023 05:03  Patient On (Oxygen Delivery Method): room air                 Input and Output:  I&O's Detail    23 Nov 2023 07:01  -  24 Nov 2023 05:26  --------------------------------------------------------  IN:  Total IN: 0 mL    OUT:    Voided (mL): 3 mL  Total OUT: 3 mL    Total NET: -3 mL          Lab Data                        12.5   8.04  )-----------( 141      ( 24 Nov 2023 00:02 )             36.3     11-23    141  |  106  |  15  ----------------------------<  110<H>  4.4   |  30  |  1.00    Ca    9.2      23 Nov 2023 15:00    TPro  7.6  /  Alb  3.8  /  TBili  0.8  /  DBili  x   /  AST  20  /  ALT  27  /  AlkPhos  70  11-23            Review of Systems	  calf pain  anxious      Objective     Physical Examination    heart s1s2  lung dec bS  head nc  verbal  alert  cn grossly int  on RA      Pertinent Lab findings & Imaging      Hernández:  NO   Adequate UO     I&O's Detail    23 Nov 2023 07:01  -  24 Nov 2023 05:26  --------------------------------------------------------  IN:  Total IN: 0 mL    OUT:    Voided (mL): 3 mL  Total OUT: 3 mL    Total NET: -3 mL               Discussed with:     Cultures:	        Radiology      ACC: [id number] EXAM:  US DPLX LWR EXT VEINS LTD    ORDERED BY: HAROON SHARMA     PROCEDURE DATE:  11/23/2023          INTERPRETATION:  CLINICAL INFORMATION: Lower extremity swelling.    COMPARISON: None available.    TECHNIQUE: Duplex sonography of the LEFT LOWER extremity veins with color   and spectral Doppler, with and without compression.    FINDINGS:    Acute deep venous thrombosis extending from the proximal femoral vein   through the popliteal vein.    Deep venous thrombosis extends into the posterior tibial and peroneal   veins.    The contralateral common femoral vein is patent.    IMPRESSION:  Acute deep venous thrombosis: above and below the knee.    Acute deep venous thrombosis extending from the femoral vein through the   popliteal and calf veins.    Findings were discussed with ELISE SHARMA  11/23/2023 4:01 PM by   Dr. MCKINLEY with read back confirmation.    --- End of Report ---            RODRÍGUEZ MCKINLEY MD; Attending Radiologist  This document has been electronically signed. Nov 23 2023  4:02PM  Abnormal.          ACC: [id number] EXAM:  CT ANGIO CHEST PULM ART [facility]   ORDERED BY: HAROON SHARMA     PROCEDURE DATE:  11/23/2023          INTERPRETATION:  CTA CHEST    INDICATION: History of left calf pain for 2 days and dizziness.    TECHNIQUE: Enhanced helical images were obtained of the chest. Coronal   and sagittal images were reconstructed Maximum intensity projection   images were generated.    Omnipaque 350 70 cc administered 30 cc discarded    COMPARISON: Chest radiograph 5/15/2013.    FINDINGS:    Pulmonary Artery:  Bilateral pulmonary emboli involving the segmental   arteries of the left upper lobe, left lower lobar artery, the segmental   arteries of the left lower lobe, the segmental arteries of the right   upper lobe, right middle lobe, and right lower lobe.    Tubes/Lines: None.    Lungs, airways and pleura: Lungs are clear. The airways and pleura are   normal.    Mediastinum: The heart is normal in size. No pericardial effusion.   Left-sided aortic arch and left-sided descending thoracic aorta. Aorta is   normal in caliber.    There are no enlarged chest lymph nodes. The visualized thyroid gland is   normal. The esophagus is unremarkable except for a hiatal hernia.    Upper Abdomen: The upper abdomen is unremarkable.    Bones And Soft Tissues: The bones are unremarkable.  The soft tissues are   unremarkable.      IMPRESSION:    1.  Bilateral pulmonary emboli.  2.  The heart is normal in size.  3.  The findings were discussed with read back verification obtained from   Dr. Tolbert on 11/23/2023 at 1704 hours.    --- End of Report ---            FREDERICK DENNIS MD; Attending Radiologist  This document has been electronically signed. Nov 23 2023  5:05PM

## 2023-11-24 NOTE — PROGRESS NOTE ADULT - ASSESSMENT
Pt is a 69 yo M w/ hx of kidney stones, HLD, GERD, presenting for calf swelling x4 days and lightheadedness x1 day admitted for pulmonary embolism and DVT  Pt is a 62 yo M w/ hx of kidney stones, HLD, GERD, presenting for calf swelling x4 days and lightheadedness x1 day admitted for pulmonary embolism and DVT

## 2023-11-24 NOTE — DISCHARGE NOTE PROVIDER - HOSPITAL COURSE
HPI as documented in H&P:  Pt is a 67 yo M w/ hx of kidney stones, HLD, GERD, presenting for calf swelling that started on Sunday 11/19. Pt states that he had traveled to [facility] to hike with his wife and his leg began feeling tight and swollen. Pt states that this had worsened, had tried to use advil metaxalone and ice packs, thinking it was just a leg sprain. Pt was taking metaxalone as needed for back pain but has not used it recently and had some 'left over'. Pt states this morning watching TV began feeling lightheaded even though swelling had been decreasing, prompting ED visit. Pt denies previous dvt or PE. Of note, pt had also gone golfing, hit his left leg on a piece of hard metal and had bruising that had not resolved until Sunday when his leg began swelling, and may have also attributed his sx to this. Pt currently feels tightness in his L calf. Pt currently denies fever, SOB, CP, n/v/d/c, CP, abd pain, RLE edema/pain.    In the ED:  Vitals: T 98 HR 74 /85 RR 16 98% RA  Given: 1L NS Bolus, heparin 6000u IV 1x,   Labs: Plt 145 Trops -   L lower extremity doppler - DVT above and below the knee (femoral through popliteal vein)  CTA - b/l PE w/ no evidence of heart strain  EKG NSR 66bpm       Hospital Course:  Pt a/w acute LLE DVT and acute b/l PE. Pt had TTE which did not show RV strain. Pt felt well and had no dizziness or lightheadedness with walking in the hospital. Oxygen saturation in the 90s on RA at rest and exertion. Cardio (Jarod) and Heme (Patricia) cleared pt for discharge to home on Eliquis and pt will have outpt f/up with heme for hypercoagulable work-up.     On day of discharge:  ROS: pt denies SOB, CP, palpitations, dizziness, lightheadedness, leg pain, fever, chills.  Vital Signs Last 24 Hrs  T(C): 36.3 (24 Nov 2023 11:45), Max: 37 (24 Nov 2023 05:03)  T(F): 97.3 (24 Nov 2023 11:45), Max: 98.6 (24 Nov 2023 05:03)  HR: 74 (24 Nov 2023 11:45) (74 - 78)  BP: 131/70 (24 Nov 2023 11:45) (131/70 - 148/84)  BP(mean): --  RR: 18 (24 Nov 2023 11:45) (17 - 18)  SpO2: 94% (24 Nov 2023 11:45) (94% - 97%)    Parameters below as of 24 Nov 2023 11:45  Patient On (Oxygen Delivery Method): room air    PHYSICAL EXAM:  GENERAL: NAD  HEENT:  anicteric, moist mucous membranes  CHEST/LUNG:  CTA b/l, no rales, wheezes, or rhonchi  HEART:  RRR, S1, S2  ABDOMEN:  BS+, soft, nontender, nondistended  EXTREMITIES: +LLE edema, no cyanosis' +mild calf tenderness to palpation  NERVOUS SYSTEM: answers questions and follows commands appropriately    Time spent: 50min
Walk in

## 2023-11-24 NOTE — PROGRESS NOTE ADULT - PROBLEM SELECTOR PLAN 1
Pt w/ no hx of PE or DVT w/ leg swelling x4 days.  LLE doppler - DVT above and below the knee (femoral through popliteal vein)  CTA - b/l PE w/ no evidence of heart strain  EKG NSR 66bpm   - ED Vitals:  HR 74 /85 RR 16 98% RA  - IN ED Given: 1L NS Bolus, heparin 6000u IV 1x,   - ED Labs: Plt 145 Trops -   - c/w heparin gtt  - Tylenol PRN for pain/discomfort  - f/u echo  - f/u AM CBC/CMP  - Pulmarychuy Vasquez consulted, f/u recs Pt w/ no hx of PE or DVT w/ leg swelling x4 days.  LLE doppler - DVT above and below the knee (femoral through popliteal vein)  CTA - b/l PE   - IN ED Given: 1L NS Bolus, heparin 6000u IV 1x,   - ED Labs: Plt 145 Trops -   - c/w heparin gtt  - Tylenol PRN for pain/discomfort  - f/u echo  - Pulm Shalshin consulted, f/u recs

## 2023-11-24 NOTE — DISCHARGE NOTE NURSING/CASE MANAGEMENT/SOCIAL WORK - NSDCPEFALRISK_GEN_ALL_CORE
For information on Fall & Injury Prevention, visit: [email or web address] OR  [email or web address] OR  https://www.cdc.gov/steadi/patient.html

## 2023-11-24 NOTE — CONSULT NOTE ADULT - SUBJECTIVE AND OBJECTIVE BOX
[facility] - Patsy Banda,  Monse, Jarod De Los Santos Goodger, Ab Lopez  Office Number: [phone]    Initial Consult Note    CHIEF COMPLAINT: Patient is a 63y old  Male who presents with a chief complaint of b/l PE, DVT (24 Nov 2023 12:25)      HPI:  Pt is a 67 yo M w/ hx of kidney stones, HLD, GERD, presenting for calf swelling that started on Sunday 11/19. Pt states that he had traveled to [facility] to hike with his wife and his leg began feeling tight and swollen. Pt states that this had worsened, had tried to use advil metaxalone and ice packs, thinking it was just a leg sprain. Pt was taking metaxalone as needed for back pain but has not used it recently and had some 'left over'. Pt states this morning watching TV began feeling lightheaded even though swelling had been decreasing, prompting ED visit. Pt denies previous dvt or PE. Of note, pt had also gone golfing, hit his left leg on a piece of hard metal and had bruising that had not resolved until Sunday when his leg began swelling, and may have also attributed his sx to this. Pt currently feels tightness in his L calf. Pt currently denies fever, SOB, CP, n/v/d/c, CP, abd pain, RLE edema/pain.\par   In the ED:  Vitals: T 98 HR 74 /85 RR 16 98% RA  Given: 1L NS Bolus, heparin 6000u IV 1x,   Labs: Plt 145 Trops -   L lower extremity doppler - DVT above and below the knee (femoral through popliteal vein)  CTA - b/l PE w/ no evidence of heart strain  EKG NSR 66bpm  (23 Nov 2023 17:34)    PAST MEDICAL & SURGICAL HISTORY:  HLD (hyperlipidemia)      GERD (gastroesophageal reflux disease)      Calculus of ureter      H/O hernia repair        SOCIAL HISTORY:  No tobacco, ethanol, or drug abuse.  FAMILY HISTORY:  FHx: cancer of digestive organ (Mother)      No family history of acute MI or sudden cardiac death.  MEDICATIONS  (STANDING):  heparin  Infusion.  Unit(s)/Hr (13 mL/Hr) IV Continuous <Continuous>  omeprazole 20 milliGRAM(s) Oral <User Schedule>  potassium citrate 5 milliEquivalent(s) Oral two times a day  rosuvastatin 2.5 milliGRAM(s) Oral <User Schedule>  tamsulosin 0.4 milliGRAM(s) Oral at bedtime    MEDICATIONS  (PRN):  acetaminophen     Tablet .. 650 milliGRAM(s) Oral every 6 hours PRN Temp greater or equal to 38C (100.4F), Mild Pain (1 - 3)  aluminum hydroxide/magnesium hydroxide/simethicone Suspension 30 milliLiter(s) Oral every 4 hours PRN Dyspepsia  heparin   Injectable 3000 Unit(s) IV Push every 6 hours PRN For aPTT between 40 - 57  heparin   Injectable 6000 Unit(s) IV Push every 6 hours PRN For aPTT less than 40  melatonin 3 milliGRAM(s) Oral at bedtime PRN Insomnia  ondansetron Injectable 4 milliGRAM(s) IV Push every 8 hours PRN Nausea and/or Vomiting    Allergies    clindamycin (Rash)  penicillin (Unknown)    Intolerances      REVIEW OF SYSTEMS:  All other review of systems is negative unless indicated above  VITAL SIGNS:   Vital Signs Last 24 Hrs  T(C): 36.3 (24 Nov 2023 11:45), Max: 37 (24 Nov 2023 05:03)  T(F): 97.3 (24 Nov 2023 11:45), Max: 98.6 (24 Nov 2023 05:03)  HR: 74 (24 Nov 2023 11:45) (74 - 78)  BP: 131/70 (24 Nov 2023 11:45) (131/70 - 172/85)  BP(mean): --  RR: 18 (24 Nov 2023 11:45) (16 - 18)  SpO2: 94% (24 Nov 2023 11:45) (94% - 98%)    Parameters below as of 24 Nov 2023 11:45  Patient On (Oxygen Delivery Method): room air      I&O's Summary    23 Nov 2023 07:01  -  24 Nov 2023 07:00  --------------------------------------------------------  IN: 331 mL / OUT: 3 mL / NET: 328 mL      On Exam:  Constitutional: NAD, alert and oriented x 3  Lungs:  Non-labored, breath sounds are clear bilaterally, No wheezing, rales or rhonchi  Cardiovascular: RRR.  S1 and S2 positive.  No murmurs, rubs, gallops or clicks  Gastrointestinal: Bowel Sounds present, soft, nontender.   Extremities:LLE swelling  Neurological: Alert, no focal deficits  Skin: No rashes or ulcers   Psych:  Mood & affect appropriate.    LABS: All Labs Reviewed:                        14.0   6.84  )-----------( 148      ( 24 Nov 2023 07:00 )             40.7                         12.5   8.04  )-----------( 141      ( 24 Nov 2023 00:02 )             36.3                         14.1   9.21  )-----------( 145      ( 23 Nov 2023 15:00 )             41.7     24 Nov 2023 07:00    142    |  108    |  13     ----------------------------<  147    3.7     |  27     |  1.10   23 Nov 2023 15:00    141    |  106    |  15     ----------------------------<  110    4.4     |  30     |  1.00     Ca    9.1        24 Nov 2023 07:00  Ca    9.2        23 Nov 2023 15:00    TPro  7.5    /  Alb  3.7    /  TBili  1.0    /  DBili  x      /  AST  24     /  ALT  27     /  AlkPhos  74     24 Nov 2023 07:00  TPro  7.6    /  Alb  3.8    /  TBili  0.8    /  DBili  x      /  AST  20     /  ALT  27     /  AlkPhos  70     23 Nov 2023 15:00    PT/INR - ( 23 Nov 2023 15:00 )   PT: 11.0 sec;   INR: 0.94 ratio         PTT - ( 24 Nov 2023 07:00 )  PTT:81.5 sec      Blood Culture:         RADIOLOGY:    EKG: SR

## 2023-11-24 NOTE — CONSULT NOTE ADULT - SUBJECTIVE AND OBJECTIVE BOX
All records reviewed.  seen w wife present    HPI:  67 yo man w hx of kidney stones, HLD, GERD, adm w left calf pain and swelling, lightheadedness and found w left leg DVT(prox fem to peroneal v), and sami PE(all lobes)  Pt reports Sunday drove 2.5 hours to [place], then while walkiing in town felt left calf pain, sx recurred next day and persistent. Drove back to [place] on Tues also noted left calf swelling and drove back to [place], On Thurs came to ER due to new lightheadedness and concern over thromboembolism  In the ED:  Vitals: T 98 HR 74 /85 RR 16 98% RA  Given: 1L NS Bolus, heparin 6000u IV 1x,   Labs: Plt 145 Trops -   L lower extremity doppler - DVT above and below the knee (femoral through popliteal vein)  CTA - b/l PE w/ no evidence of heart strain  EKG NSR 66bpm  (23 Nov 2023 17:34)    No self or fam hx thromboembolism  No exogenous testosterone use  May had left renal stone surgery post 3 day hospitalization  Aug had injury to inner left ankle w persistent sl swelling in region    No night sweats fever anorexia or weight loss  Never had SOB  Since hosp, no further lightheadedness and left calf softer      PAST MEDICAL & SURGICAL HISTORY:  HLD (hyperlipidemia)      GERD (gastroesophageal reflux disease)      Calculus of ureter      H/O hernia repair          Review of System:  see above    MEDICATIONS  (STANDING):  heparin  Infusion.  Unit(s)/Hr (13 mL/Hr) IV Continuous <Continuous>  omeprazole 20 milliGRAM(s) Oral <User Schedule>  potassium citrate 5 milliEquivalent(s) Oral two times a day  rosuvastatin 2.5 milliGRAM(s) Oral <User Schedule>  tamsulosin 0.4 milliGRAM(s) Oral at bedtime    MEDICATIONS  (PRN):  acetaminophen     Tablet .. 650 milliGRAM(s) Oral every 6 hours PRN Temp greater or equal to 38C (100.4F), Mild Pain (1 - 3)  aluminum hydroxide/magnesium hydroxide/simethicone Suspension 30 milliLiter(s) Oral every 4 hours PRN Dyspepsia  heparin   Injectable 3000 Unit(s) IV Push every 6 hours PRN For aPTT between 40 - 57  heparin   Injectable 6000 Unit(s) IV Push every 6 hours PRN For aPTT less than 40  melatonin 3 milliGRAM(s) Oral at bedtime PRN Insomnia  ondansetron Injectable 4 milliGRAM(s) IV Push every 8 hours PRN Nausea and/or Vomiting      Allergies    clindamycin (Rash)  penicillin (Unknown)    Intolerances        SOCIAL HISTORY:  never tobacco    FAMILY HISTORY:  mother ovarian/uterine ca age 60s    Vital Signs Last 24 Hrs  T(C): 36.3 (24 Nov 2023 11:45), Max: 37 (24 Nov 2023 05:03)  T(F): 97.3 (24 Nov 2023 11:45), Max: 98.6 (24 Nov 2023 05:03)  HR: 74 (24 Nov 2023 11:45) (74 - 78)  BP: 131/70 (24 Nov 2023 11:45) (131/70 - 172/85)  BP(mean): --  RR: 18 (24 Nov 2023 11:45) (16 - 18)  SpO2: 94% (24 Nov 2023 11:45) (94% - 98%)    Parameters below as of 24 Nov 2023 11:45  Patient On (Oxygen Delivery Method): room air        PHYSICAL EXAM:      General: up in bed,, in no acute distress  Eyes:sclera anicteric, pupils equal and EOMI  ENMT:buccal mucosa moist, pharynx not injected  Neck:supple, trachea midline  Lungs:clear, no wheeze/rhonchi  Cardiovascular:regular rate and rhythm, S1 S2  Abdomen:soft, nontender, no organomegaly present, bowel sounds normal  Neurological:alert and oriented x3, Cranial Nerves II-XII grossly intact  Skin:no increased ecchymosis/petechiae/purpura  Lymph Nodes:no palpable cervical/supraclavicular lymph nodes enlargements  Extremities:no cyanosis/clubbing. Trace edema left lower leg to below knee        LABS:                        14.0   6.84  )-----------( 148      ( 24 Nov 2023 07:00 )             40.7     11-24 @ 07:00  WBC6.84  RBC4.65 Hgb14.0 Hct40.7  MCV87.5  Szbw786  Auto Ajzame57.0 Band-- Auto Lymph23.4 Atypical Lymph-- Reactive Lymph-- Auto Mono9.5 Auto Eos4.5 Auto Baso0.6        11-24 @ 00:02  WBC8.04  RBC4.12 Hgb12.5 Hct36.3  MCV88.1  Bdew858  Auto Neutro-- Band-- Auto Lymph-- Atypical Lymph-- Reactive Lymph-- Auto Mono-- Auto Eos-- Auto Baso--        11-23 @ 15:00  WBC9.21  RBC4.69 Hgb14.1 Hct41.7  MCV88.9  Eojp308  Auto Frvavt96.9 Band-- Auto Lymph15.0 Atypical Lymph-- Reactive Lymph-- Auto Mono10.5 Auto Eos3.0 Auto Baso0.4          11-24    142  |  108  |  13  ----------------------------<  147<H>  3.7   |  27  |  1.10    Ca    9.1      24 Nov 2023 07:00    TPro  7.5  /  Alb  3.7  /  TBili  1.0  /  DBili  x   /  AST  24  /  ALT  27  /  AlkPhos  74  11-24 11-24 @ 07:00  PT-- INR--  PTT81.5  11-24 @ 00:02  PT-- INR--  CVA045.8  11-23 @ 15:00  PT11.0 INR0.94  PTT33.4    Urinalysis Basic - ( 24 Nov 2023 07:00 )    Color: x / Appearance: x / SG: x / pH: x  Gluc: 147 mg/dL / Ketone: x  / Bili: x / Urobili: x   Blood: x / Protein: x / Nitrite: x   Leuk Esterase: x / RBC: x / WBC x   Sq Epi: x / Non Sq Epi: x / Bacteria: x        PERIPHERAL BLOOD SMEAR REVIEW:      RADIOLOGY & ADDITIONAL STUDIES:  < from: CT Angio Chest PE Protocol w/ IV Cont (11.23.23 @ 16:58) >    ACC: [id number] EXAM:  CT ANGIO CHEST PULM ART [facility]   ORDERED BY: HAROON SHARMA     PROCEDURE DATE:  11/23/2023          INTERPRETATION:  CTA CHEST    INDICATION: History of left calf pain for 2 days and dizziness.    TECHNIQUE: Enhanced helical images were obtained of the chest. Coronal   and sagittal images were reconstructed Maximum intensity projection   images were generated.    Omnipaque 350 70 cc administered 30 cc discarded    COMPARISON: Chest radiograph 5/15/2013.    FINDINGS:    Pulmonary Artery:  Bilateral pulmonary emboli involving the segmental   arteries of the left upper lobe, left lower lobar artery, the segmental   arteries of the left lower lobe, the segmental arteries of the right   upper lobe, right middle lobe, andright lower lobe.    Tubes/Lines: None.    Lungs, airways and pleura: Lungs are clear. The airways and pleura are   normal.    Mediastinum: The heart is normal in size. No pericardial effusion.   Left-sided aortic arch and left-sided descending thoracic aorta. Aorta is   normal in caliber.    There are no enlarged chest lymph nodes. The visualized thyroid gland is   normal. The esophagus is unremarkable except for a hiatal hernia.    Upper Abdomen: The upper abdomen is unremarkable.    Bones And Soft Tissues: The bones are unremarkable.  The soft tissues are   unremarkable.      IMPRESSION:    1.  Bilateral pulmonary emboli.  2.  The heart is normal in size.  3.  The findings were discussed with read back verification obtained from   Dr. Tolbert on 11/23/2023 at 1704 hours.    --- End of Report ---    < end of copied text >  < from: US Duplex Venous Lower Ext Ltd, Left (11.23.23 @ 16:00) >    ACC: [id number] EXAM:  US DPLX LWR EXT VEINS LTD LT   ORDERED BY: HAROON SHARMA     PROCEDURE DATE:  11/23/2023          INTERPRETATION:  CLINICAL INFORMATION: Lower extremity swelling.    COMPARISON: None available.    TECHNIQUE: Duplex sonography of the LEFT LOWER extremity veins with color   and spectral Doppler, with and without compression.    FINDINGS:    Acute deep venous thrombosis extending from the proximal femoral vein   through the popliteal vein.    Deep venous thrombosis extends into the posterior tibial and peroneal   veins.    The contralateral common femoral vein is patent.    IMPRESSION:  Acute deep venous thrombosis: above and below the knee.    Acute deep venous thrombosis extending from the femoral vein through the   popliteal and calf veins.    Findings were discussed with ELISE SHARMA  11/23/2023 4:01 PM by   Dr. MCKINLEY with read back confirmation.    --- End of Report ---        < end of copied text >  < from: CT Renal Stone Hunt (05.25.23 @ 15:16) >    EXAM: [id number] - CT RENAL STONE MOYA  - ORDERED BY: DAVID M HOENIG      PROCEDURE DATE:  05/25/2023           INTERPRETATION:  CLINICAL INFORMATION: Status post PCNL with flank pain   left lower quadrant    COMPARISON: 4/23 exam    CONTRAST/COMPLICATIONS:  IV Contrast: NONE  Oral Contrast: NONE  Complications: None reported at time of study completion    PROCEDURE:  CT of the Abdomen and Pelvis was performed.  Sagittal and coronal reformats were performed.    FINDINGS:  LOWER CHEST: Within normal limits.    LIVER: Within normal limits.  BILE DUCTS: Normal caliber.  GALLBLADDER: Within normal limits.  SPLEEN: Stable 1 cm hypodensity  PANCREAS: Within normal limits.  ADRENALS: Within normal limits.  KIDNEYS/URETERS: Malrotated left kidney again identified with mild degree   of perinephric stranding. This appears to extend to a small/moderate   amount of fluid tracking along the left iliopsoas margin. No organized   collection could be seen. Previously identified left calculus no longer   present    BLADDER: Within normal limits.  REPRODUCTIVE ORGANS: Prostate is enlarged.    BOWEL: No bowel obstruction.  PERITONEUM: No ascites.  VESSELS: Within normal limits.  RETROPERITONEUM/LYMPH NODES: No lymphadenopathy.  ABDOMINAL WALL: Within normal limits.  BONES: Within normal limits.    IMPRESSION:  The patient is status post lithotripsy there appears be small to moderate   amount of fluid tracking along the course of the left iliopsoas margin   without organized collection. Minimal surrounding infiltrative changes   around the perinephric space also seen.    Previously seen calculus no longer identified. There is no hydronephrosis        < end of copied text >

## 2023-11-24 NOTE — CARE COORDINATION ASSESSMENT. - NSTRANSPORTNEEDS_GEN_ALL_CORE
Auto Closure 4 Information: This tab is for additional flaps and grafts above and beyond our usual structured repairs.  Please note if you enter information here it will not currently bill and you will need to add the billing information manually.

## 2023-11-24 NOTE — CONSULT NOTE ADULT - ASSESSMENT
69 yo man w hx of kidney stones, HLD, GERD, adm w left calf pain and swelling, lightheadedness and found w left leg DVT(prox fem to peroneal v)  on 11/2023 Duplex left leg , and sami PE(all lobes)on 11/23 CTA PE protocol   Recent driving/trip to [place] w left calf sx deveopled during that time  No self prior or family hx thromboembolism  5/2023 left renal stone surgery removal w CT renal hunt no sig other findings, 3 d hosp no complications  8/2023 trauma to left inner ankle w persistent sl swelling  No evidence of drug effect  Has had longer trips in past no complications  Active, never smoker    -sami PE and above/below knee left DVT-appear unprovoked, sx started w the car trip but duration of trip only 2.5hours  -on IV heparin, tolerating well, no further lightheadedness  -as VTE is unprovoked, will need lifetime anticoagulation, as otherwise studies have shown increase of recurrence w time without plateau  -when ready for discharge can change IV heparin to oral, DOAC, id Eliquis or Xarelto  -will follow up patient in office for additional outpatient eval and management of VTE(Venous ThromboEmbolism)    discussed w pt and wife  thank you, please call if any questions

## 2023-11-24 NOTE — CONSULT NOTE ADULT - NS ATTEND AMEND GEN_ALL_CORE FT
64 yo M with PMHx of HTN, HLD. GERD, presenting with L calf swelling and lightheadedness. Admitted for PE and DVT    Feeling well on my exam. no cp  - No signs of significant ischemia or volume overload.   - ekg nonischemic      - CTA showed b/l PE  - US duplex LLE with acute deep venous thrombosis: above and below the knee. extending from the femoral vein through the   popliteal and calf veins.  - continue heparin gtt for full AC transition to eliquis when able  - TTE with normal LV and RV.     Christiano Garcia as outpt

## 2023-11-24 NOTE — PROGRESS NOTE ADULT - SUBJECTIVE AND OBJECTIVE BOX
Patient is a 63y old  Male who presents with a chief complaint of b/l PE, DVT (24 Nov 2023 05:26)      INTERVAL HPI/OVERNIGHT EVENTS: Patient seen and examined at bedside. No overnight events occurred. Patient has no complaints at this time. Patient states that leg swelling and erythema improved significantly. Denies fevers, chills, headache, lightheadedness, chest pain, dyspnea, abdominal pain, n/v/d/c.    MEDICATIONS  (STANDING):  atorvastatin 20 milliGRAM(s) Oral at bedtime  heparin  Infusion.  Unit(s)/Hr (13 mL/Hr) IV Continuous <Continuous>  pantoprazole    Tablet 40 milliGRAM(s) Oral before breakfast  tamsulosin 0.4 milliGRAM(s) Oral at bedtime    MEDICATIONS  (PRN):  acetaminophen     Tablet .. 650 milliGRAM(s) Oral every 6 hours PRN Temp greater or equal to 38C (100.4F), Mild Pain (1 - 3)  aluminum hydroxide/magnesium hydroxide/simethicone Suspension 30 milliLiter(s) Oral every 4 hours PRN Dyspepsia  heparin   Injectable 3000 Unit(s) IV Push every 6 hours PRN For aPTT between 40 - 57  heparin   Injectable 6000 Unit(s) IV Push every 6 hours PRN For aPTT less than 40  melatonin 3 milliGRAM(s) Oral at bedtime PRN Insomnia  ondansetron Injectable 4 milliGRAM(s) IV Push every 8 hours PRN Nausea and/or Vomiting      Allergies    clindamycin (Rash)  penicillin (Unknown)    Intolerances        REVIEW OF SYSTEMS:  CONSTITUTIONAL: No fever or chills  HEENT:  No headache, no sore throat  RESPIRATORY: No cough, wheezing, or shortness of breath  CARDIOVASCULAR: No chest pain, palpitations  GASTROINTESTINAL: No abd pain, nausea, vomiting, or diarrhea  GENITOURINARY: No dysuria, frequency, or hematuria  NEUROLOGICAL: no focal weakness or dizziness  MUSCULOSKELETAL: no myalgias     Vital Signs Last 24 Hrs  T(C): 37 (24 Nov 2023 05:03), Max: 37 (24 Nov 2023 05:03)  T(F): 98.6 (24 Nov 2023 05:03), Max: 98.6 (24 Nov 2023 05:03)  HR: 77 (24 Nov 2023 05:03) (74 - 78)  BP: 148/84 (24 Nov 2023 05:03) (134/76 - 172/85)  BP(mean): --  RR: 18 (24 Nov 2023 05:03) (16 - 18)  SpO2: 97% (24 Nov 2023 05:03) (96% - 98%)    Parameters below as of 24 Nov 2023 05:03  Patient On (Oxygen Delivery Method): room air        PHYSICAL EXAM:  GENERAL: NAD  HEENT:  anicteric, moist mucous membranes  CHEST/LUNG:  CTA b/l, no rales, wheezes, or rhonchi  HEART:  RRR, S1, S2  ABDOMEN:  BS+, soft, nontender, nondistended  EXTREMITIES: no edema, cyanosis, or calf tenderness  NERVOUS SYSTEM: answers questions and follows commands appropriately    LABS:                        14.0   6.84  )-----------( 148      ( 24 Nov 2023 07:00 )             40.7     CBC Full  -  ( 24 Nov 2023 07:00 )  WBC Count : 6.84 K/uL  Hemoglobin : 14.0 g/dL  Hematocrit : 40.7 %  Platelet Count - Automated : 148 K/uL  Mean Cell Volume : 87.5 fl  Mean Cell Hemoglobin : 30.1 pg  Mean Cell Hemoglobin Concentration : 34.4 gm/dL  Auto Neutrophil # : 4.17 K/uL  Auto Lymphocyte # : 1.60 K/uL  Auto Monocyte # : 0.65 K/uL  Auto Eosinophil # : 0.31 K/uL  Auto Basophil # : 0.04 K/uL  Auto Neutrophil % : 61.0 %  Auto Lymphocyte % : 23.4 %  Auto Monocyte % : 9.5 %  Auto Eosinophil % : 4.5 %  Auto Basophil % : 0.6 %    24 Nov 2023 07:00    142    |  108    |  13     ----------------------------<  147    3.7     |  27     |  1.10     Ca    9.1        24 Nov 2023 07:00    TPro  7.5    /  Alb  3.7    /  TBili  1.0    /  DBili  x      /  AST  24     /  ALT  27     /  AlkPhos  74     24 Nov 2023 07:00    PT/INR - ( 23 Nov 2023 15:00 )   PT: 11.0 sec;   INR: 0.94 ratio         PTT - ( 24 Nov 2023 07:00 )  PTT:81.5 sec  Urinalysis Basic - ( 24 Nov 2023 07:00 )    Color: x / Appearance: x / SG: x / pH: x  Gluc: 147 mg/dL / Ketone: x  / Bili: x / Urobili: x   Blood: x / Protein: x / Nitrite: x   Leuk Esterase: x / RBC: x / WBC x   Sq Epi: x / Non Sq Epi: x / Bacteria: x      CAPILLARY BLOOD GLUCOSE              RADIOLOGY & ADDITIONAL TESTS:    Personally reviewed.     Consultant(s) Notes Reviewed:  [x] YES  [ ] NO

## 2023-11-24 NOTE — CONSULT NOTE ADULT - ASSESSMENT
67 yo M w/ hx of kidney stones, HLD, GERD, presenting for calf swelling that started on Sunday 11/19. Pt states that he had traveled to [facility] to hike with his wife and his leg began feeling tight and swollen 69 yo M w/ hx of kidney stones, HLD, GERD, presenting for calf swelling that started on Sunday 11/19. Pt states that he had traveled to [facility] to hike with his wife and his leg began feeling tight and swollen    pe  dvt  bph  hld  gerd      consider vascular eval - extensive DVT  CT reviewed with pt and wife  AC - eventual transition to DOAC  heme eval as outpatient for Hypercoagulation w/u  mobilize - ambulate  check Sat on RA  pain relief  elevate affected extr  TTE   eval RV  tele monitored

## 2023-11-24 NOTE — DISCHARGE NOTE PROVIDER - CARE PROVIDER_API CALL
Radha Gomez  Medical Oncology  [address] [facility], [address] [place] [id number]  Phone: [phone]  Fax: [phone]  Follow Up Time:

## 2023-11-24 NOTE — DISCHARGE NOTE NURSING/CASE MANAGEMENT/SOCIAL WORK - PATIENT PORTAL LINK FT
You can access the FollowMyHealth Patient Portal offered by [facility] by registering at the following website: [email or web address]. By joining [facility]’s FollowMyHealth portal, you will also be able to view your health information using other applications (apps) compatible with our system.

## 2023-11-24 NOTE — PROGRESS NOTE ADULT - PROBLEM SELECTOR PLAN 3
Chronic  - Pt had kidney stone removed early in Mar-May 2023 per pt.   - Continue home Flomax 0.4mg qd  - c/w home potassium citrate  - Hem Onc Dr. Ann consulted, f/u recs

## 2023-11-24 NOTE — CARE COORDINATION ASSESSMENT. - OTHER PERTINENT REFERRAL INFORMATION
CM met with patient and spouse at bedside to explain role and transitions of care. Patient lives with spouse in a private house with 2 steps to get in 12 to basement and 12 to the second floor.  Patient is fully independent.  No caregiver identified (self), no home care or DMEs.  Spouse will transport patient home when ready to be discharge.  No needs identified. Patient and spouse verbalized understanding of plans after discharge and are in agreement.  Resource folder left at bedside.  All questions answered to the best of my abilities.  CM remains available throughout the hospital stay.

## 2023-11-24 NOTE — DISCHARGE NOTE PROVIDER - NSDCFUSCHEDAPPT_GEN_ALL_CORE_FT
[facility]  UROLOGY [address] [place] [address] R  Scheduled Appointment: 02/16/2024    Hoenig, David  [facility]  UROLOGY [address] [place] [address] R  Scheduled Appointment: 02/16/2024

## 2023-11-24 NOTE — CARE COORDINATION ASSESSMENT. - NSCAREPROVIDERS_GEN_ALL_CORE_FT
CARE PROVIDERS:  Accepting Physician: Terry Loera  Administration: Rafael Sanchez  Administration: Antonio Gould  Admitting: Terry Loera  Attending: Sergio Heaton  Cardiology Technician: Minerva Rodriguez  Consultant: Radha Gomez  Consultant: Moo Vasquez  ED ACP: Vonda Shaw  ED Attending: Ewa Tolbert  ED Nurse: Michael Root  Emergency Medicine: [facility]  Nurse: Radha Bragg  Nurse: Dena Thornton  Nurse: Hermelinda Brenann  Ordered: ADM, User  Override: Kristina Ward  Override: Benigno Epps  PCA/Nursing Assistant: Milla Padilla  PCA/Nursing Assistant: Armaan Thornton  Primary Team: Vicki Lara  Primary Team: Rob Thomas  Primary Team: Sergio Heaton  Primary Team: Steve Dotson  Respiratory Therapy: Mark Sun  Team: [facility], Team

## 2023-11-25 ENCOUNTER — TRANSCRIPTION ENCOUNTER (OUTPATIENT)
Age: 63
End: 2023-11-25

## 2023-11-26 ENCOUNTER — TRANSCRIPTION ENCOUNTER (OUTPATIENT)
Age: 63
End: 2023-11-26

## 2023-11-27 ENCOUNTER — TRANSCRIPTION ENCOUNTER (OUTPATIENT)
Age: 63
End: 2023-11-27

## 2023-11-28 ENCOUNTER — NON-APPOINTMENT (OUTPATIENT)
Age: 63
End: 2023-11-28

## 2023-11-28 ENCOUNTER — TRANSCRIPTION ENCOUNTER (OUTPATIENT)
Age: 63
End: 2023-11-28

## 2023-12-05 ENCOUNTER — TRANSCRIPTION ENCOUNTER (OUTPATIENT)
Age: 63
End: 2023-12-05

## 2023-12-15 ENCOUNTER — APPOINTMENT (OUTPATIENT)
Dept: CARDIOLOGY | Facility: CLINIC | Age: 63
End: 2023-12-15
Payer: COMMERCIAL

## 2023-12-15 VITALS
WEIGHT: 164 LBS | SYSTOLIC BLOOD PRESSURE: 137 MMHG | DIASTOLIC BLOOD PRESSURE: 79 MMHG | HEIGHT: 69 IN | HEART RATE: 82 BPM | BODY MASS INDEX: 24.29 KG/M2 | OXYGEN SATURATION: 99 %

## 2023-12-15 PROCEDURE — 99204 OFFICE O/P NEW MOD 45 MIN: CPT

## 2023-12-15 RX ORDER — APIXABAN 5 MG/1
5 TABLET, FILM COATED ORAL
Qty: 180 | Refills: 3 | Status: ACTIVE | COMMUNITY
Start: 2023-12-15 | End: 1900-01-01

## 2023-12-15 NOTE — REASON FOR VISIT
[FreeTextEntry1] : 63M, HLD and GERD  Thankgiving had L leg swelling found to have a DVT and Pulmoanry embolism  Colonoscopy 2021 - no cancer PSA - 3.88 (previously elevated) - followed by urology   no family history of VTE.   Meds:  Eliquis 5mg BID Omprazole Potassium Rosuvastatin  Left leg swelling has improved.  He is wearing compression  Spent 1 day at [facility]. Heparin gtt and then transitioned to Eliquis Breathing is fine.   seen by hematologist at [facility].   Oct R should tendonitis, [place] shot [facility] [place] [facility] [place]5 hours - he drove.  Then started feeling a calf strain.

## 2023-12-15 NOTE — ASSESSMENT
[FreeTextEntry1] : Asessment: 1.  L LE DVT 2.  PE - bilateral 3.  Unprovoked   Plan 1.  CT venogram rule out L central venous compression  2.  Venous duplex in 6 weeks 3.  Start thrombophilia workup - referral to hematoogy  4.  Cotninue eliquis 5mg BID - rx sent in 5.  return in 2-3 months  6.  Compression garments and leg elevation   * Rakel's

## 2023-12-15 NOTE — PHYSICAL EXAM
[Normal Jugular Venous A Waves Present] : normal jugular venous A waves present [Normal Jugular Venous V Waves Present] : normal jugular venous V waves present [No Jugular Venous Delgado A Waves] : no jugular venous delgado A waves [Heart Rate And Rhythm] : heart rate and rhythm were normal [Heart Sounds] : normal S1 and S2 [Murmurs] : no murmurs present [Respiration, Rhythm And Depth] : normal respiratory rhythm and effort [Exaggerated Use Of Accessory Muscles For Inspiration] : no accessory muscle use [Auscultation Breath Sounds / Voice Sounds] : lungs were clear to auscultation bilaterally [Abdomen Soft] : soft [Abdomen Tenderness] : non-tender [Abdomen Mass (___ Cm)] : no abdominal mass palpated [Abnormal Walk] : normal gait [Gait - Sufficient For Exercise Testing] : the gait was sufficient for exercise testing [Nail Clubbing] : no clubbing of the fingernails [Cyanosis, Localized] : no localized cyanosis [Petechial Hemorrhages (___cm)] : no petechial hemorrhages [] : no ischemic changes [FreeTextEntry1] : STEFANIA conti

## 2023-12-18 LAB
ALBUMIN SERPL ELPH-MCNC: 4.5 G/DL
ALP BLD-CCNC: 66 U/L
ALT SERPL-CCNC: 24 U/L
ANION GAP SERPL CALC-SCNC: 11 MMOL/L
AST SERPL-CCNC: 22 U/L
AT III PPP CHRO-ACNC: 120 %
BILIRUB SERPL-MCNC: 0.5 MG/DL
BUN SERPL-MCNC: 17 MG/DL
CALCIUM SERPL-MCNC: 9.7 MG/DL
CHLORIDE SERPL-SCNC: 104 MMOL/L
CO2 SERPL-SCNC: 27 MMOL/L
CREAT SERPL-MCNC: 1.03 MG/DL
DEPRECATED D DIMER PPP IA-ACNC: 898 NG/ML DDU
EGFR: 81 ML/MIN/1.73M2
GLUCOSE SERPL-MCNC: 94 MG/DL
HCT VFR BLD CALC: 41.7 %
HCYS SERPL-MCNC: 9.1 UMOL/L
HGB BLD-MCNC: 13.7 G/DL
MCHC RBC-ENTMCNC: 29.9 PG
MCHC RBC-ENTMCNC: 32.9 GM/DL
MCV RBC AUTO: 91 FL
NT-PROBNP SERPL-MCNC: 79 PG/ML
PLATELET # BLD AUTO: 251 K/UL
POTASSIUM SERPL-SCNC: 4.9 MMOL/L
PROT SERPL-MCNC: 7 G/DL
RBC # BLD: 4.58 M/UL
RBC # FLD: 13 %
SODIUM SERPL-SCNC: 142 MMOL/L
WBC # FLD AUTO: 4.57 K/UL

## 2023-12-19 LAB
APCR PPP: 2.76 RATIO
FVL BLANK: 38.1
FVL TEST: 105.2
PTR INTERP: NORMAL

## 2023-12-20 ENCOUNTER — TRANSCRIPTION ENCOUNTER (OUTPATIENT)
Age: 63
End: 2023-12-20

## 2023-12-20 LAB
APO LP(A) SERPL-MCNC: 16.6 NMOL/L
DNA PLOIDY SPEC FC-IMP: NORMAL

## 2023-12-21 LAB — TPA AG PPP IA-MCNC: 4 IU/ML

## 2023-12-29 ENCOUNTER — OUTPATIENT (OUTPATIENT)
Dept: OUTPATIENT SERVICES | Facility: HOSPITAL | Age: 63
LOS: 1 days | End: 2023-12-29
Payer: COMMERCIAL

## 2023-12-29 ENCOUNTER — APPOINTMENT (OUTPATIENT)
Dept: CT IMAGING | Facility: CLINIC | Age: 63
End: 2023-12-29
Payer: COMMERCIAL

## 2023-12-29 DIAGNOSIS — Z98.890 OTHER SPECIFIED POSTPROCEDURAL STATES: Chronic | ICD-10-CM

## 2023-12-29 DIAGNOSIS — I82.402 ACUTE EMBOLISM AND THROMBOSIS OF UNSPECIFIED DEEP VEINS OF LEFT LOWER EXTREMITY: ICD-10-CM

## 2023-12-29 PROCEDURE — 74174 CTA ABD&PLVS W/CONTRAST: CPT

## 2023-12-29 PROCEDURE — 74174 CTA ABD&PLVS W/CONTRAST: CPT | Mod: 26

## 2024-01-04 LAB — PAI1 AG PPP IA-ACNC: 51.5 NG/ML

## 2024-01-30 ENCOUNTER — LABORATORY RESULT (OUTPATIENT)
Age: 64
End: 2024-01-30

## 2024-02-03 ENCOUNTER — OUTPATIENT (OUTPATIENT)
Dept: OUTPATIENT SERVICES | Facility: HOSPITAL | Age: 64
LOS: 1 days | End: 2024-02-03
Payer: COMMERCIAL

## 2024-02-03 ENCOUNTER — APPOINTMENT (OUTPATIENT)
Dept: ULTRASOUND IMAGING | Facility: CLINIC | Age: 64
End: 2024-02-03
Payer: COMMERCIAL

## 2024-02-03 DIAGNOSIS — Z98.890 OTHER SPECIFIED POSTPROCEDURAL STATES: Chronic | ICD-10-CM

## 2024-02-03 DIAGNOSIS — N20.0 CALCULUS OF KIDNEY: ICD-10-CM

## 2024-02-03 PROCEDURE — 93970 EXTREMITY STUDY: CPT | Mod: 26

## 2024-02-03 PROCEDURE — 76770 US EXAM ABDO BACK WALL COMP: CPT

## 2024-02-03 PROCEDURE — 76770 US EXAM ABDO BACK WALL COMP: CPT | Mod: 26

## 2024-02-03 PROCEDURE — 93970 EXTREMITY STUDY: CPT

## 2024-02-09 ENCOUNTER — APPOINTMENT (OUTPATIENT)
Dept: CARDIOLOGY | Facility: CLINIC | Age: 64
End: 2024-02-09
Payer: COMMERCIAL

## 2024-02-09 VITALS
WEIGHT: 164 LBS | OXYGEN SATURATION: 98 % | SYSTOLIC BLOOD PRESSURE: 136 MMHG | HEART RATE: 80 BPM | BODY MASS INDEX: 24.29 KG/M2 | DIASTOLIC BLOOD PRESSURE: 75 MMHG | HEIGHT: 69 IN

## 2024-02-09 DIAGNOSIS — Z87.19 PERSONAL HISTORY OF OTHER DISEASES OF THE DIGESTIVE SYSTEM: ICD-10-CM

## 2024-02-09 DIAGNOSIS — E78.5 HYPERLIPIDEMIA, UNSPECIFIED: ICD-10-CM

## 2024-02-09 PROCEDURE — 99214 OFFICE O/P EST MOD 30 MIN: CPT

## 2024-02-09 NOTE — PHYSICAL EXAM
[Normal Jugular Venous A Waves Present] : normal jugular venous A waves present [Normal Jugular Venous V Waves Present] : normal jugular venous V waves present [No Jugular Venous Delgado A Waves] : no jugular venous delgado A waves [Respiration, Rhythm And Depth] : normal respiratory rhythm and effort [Exaggerated Use Of Accessory Muscles For Inspiration] : no accessory muscle use [Auscultation Breath Sounds / Voice Sounds] : lungs were clear to auscultation bilaterally [Heart Rate And Rhythm] : heart rate and rhythm were normal [Heart Sounds] : normal S1 and S2 [Murmurs] : no murmurs present [Abdomen Soft] : soft [Abdomen Tenderness] : non-tender [Abdomen Mass (___ Cm)] : no abdominal mass palpated [Abnormal Walk] : normal gait [Gait - Sufficient For Exercise Testing] : the gait was sufficient for exercise testing [Nail Clubbing] : no clubbing of the fingernails [Cyanosis, Localized] : no localized cyanosis [Petechial Hemorrhages (___cm)] : no petechial hemorrhages [] : no ischemic changes [FreeTextEntry1] : L LE edema reduced. No R LE edema [No Skin Ulcers] : no skin ulcer [Oriented To Time, Place, And Person] : oriented to person, place, and time

## 2024-02-09 NOTE — ASSESSMENT
[FreeTextEntry1] : Assessment: 1.  L LE DVT in 11/2023 2.  PE - bilateral 3.  Unprovoked   Plan 1.  CT venogram negative for central venous compression.  2.  Repeat LE venous duplex with improved thrombus burden.      Next LE venous duplex in 3 months, 3. Appreciate Hematology.  4. Continue Eliquis 5mg BID. 5. Compression garments and leg elevation. 6. Can purchase pneumatic pump online to assist in compression.   7. Office visit in 3 months.   * Rakel's

## 2024-02-09 NOTE — REASON FOR VISIT
[FreeTextEntry1] : 2/9/2024  Denies C/P or SOB. Reports reduced LE edema. Wearing compression stockings daily. Saw Hematologist.  KATHY Antigen 51.5. Factor V Leiden / Prothrombin Gene Mutation Negative.    Medications:  Eliquis 5mg BID Omeprazole Potassium Rosuvastatin  LE venous duplex 2/4: No evidence of deep venous thrombosis in the right lower extremity. Markedly improved clot burden in the left leg with small residual from the distal femoral to popliteal to tibial peroneal trunk.  1/9/2024     CT venogram results discussed with patient. No central venous compression. Recommend follow-up with urology for kidney findings.  VESSELS: No aortic aneurysm. No evidence of venous thrombosis within the inferior vena cava, bilateral common iliac and external iliac veins and bilateral internal iliac veins. No evidence of extrinsic compression of the central venous vasculature.  KIDNEYS/URETERS: No hydronephrosis bilaterally. Bilateral nonobstructive intrarenal calculi. Numerous subcentimeter hypodensities bilaterally to small to characterize. Mild malrotation of the left kidney.  12/2023  63M, HLD and GERD  Thankgiving had L leg swelling found to have a DVT and Pulmoanry embolism  Colonoscopy 2021 - no cancer PSA - 3.88 (previously elevated) - followed by urology   no family history of VTE.   Meds:  Eliquis 5mg BID Omprazole Potassium Rosuvastatin  Left leg swelling has improved.  He is wearing compression  Spent 1 day at [facility]. Heparin gtt and then transitioned to Eliquis Breathing is fine.   seen by hematologist at [facility].   Oct R should tendonitis, cortizone shot drove to [place] sunday before thanksgiving - 2.5 hours - he drove.  Then started feeling a calf strain.

## 2024-02-09 NOTE — REVIEW OF SYSTEMS
[Lower Ext Edema] : lower extremity edema [Negative] : Heme/Lymph [FreeTextEntry5] : see HPI [FreeTextEntry9] : see HPI

## 2024-02-16 ENCOUNTER — APPOINTMENT (OUTPATIENT)
Dept: UROLOGY | Facility: CLINIC | Age: 64
End: 2024-02-16
Payer: COMMERCIAL

## 2024-02-16 VITALS — DIASTOLIC BLOOD PRESSURE: 72 MMHG | SYSTOLIC BLOOD PRESSURE: 121 MMHG | HEART RATE: 80 BPM

## 2024-02-16 DIAGNOSIS — N20.0 CALCULUS OF KIDNEY: ICD-10-CM

## 2024-02-16 DIAGNOSIS — N20.9 URINARY CALCULUS, UNSPECIFIED: ICD-10-CM

## 2024-02-16 PROCEDURE — 99213 OFFICE O/P EST LOW 20 MIN: CPT

## 2024-02-22 PROBLEM — N20.0 NEPHROLITHIASIS: Status: ACTIVE | Noted: 2023-01-20

## 2024-02-22 PROBLEM — N20.9 URIC ACID UROLITHIASIS: Status: ACTIVE | Noted: 2023-05-30

## 2024-02-22 NOTE — ASSESSMENT
[FreeTextEntry1] : Renal US (radiology reviewed)- Bilateral parenchymal echogenic foci, no shadowing. Bilateral simple cysts.  Left kidney pelvic fullness.  compared to CT- CT had no sig stones  continue Tamsulosin 0.4 mg, Potassium citrate 5 MEQ x 2 BID, and baking soda 1/4 teaspoon BID.   plan  1) 24 hr urine  2) RTC 6 months with Renal US  overall doing well at this time

## 2024-02-22 NOTE — PHYSICAL EXAM
[General Appearance - Well Developed] : well developed [General Appearance - Well Nourished] : well nourished [Normal Appearance] : normal appearance [Well Groomed] : well groomed [General Appearance - In No Acute Distress] : no acute distress [Abdomen Soft] : soft [Costovertebral Angle Tenderness] : no ~M costovertebral angle tenderness [Edema] : no peripheral edema [] : no respiratory distress [Respiration, Rhythm And Depth] : normal respiratory rhythm and effort [Exaggerated Use Of Accessory Muscles For Inspiration] : no accessory muscle use [Oriented To Time, Place, And Person] : oriented to person, place, and time [Affect] : the affect was normal [Mood] : the mood was normal [Not Anxious] : not anxious [Normal Station and Gait] : the gait and station were normal for the patient's age [FreeTextEntry1] : tender lower spine, bilateral lower back paraspinal

## 2024-02-22 NOTE — HISTORY OF PRESENT ILLNESS
[None] : None [FreeTextEntry1] : 63 yr old male presents to follow up with kidney stones. 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 Pt now taking Eliquis 5 mg BID.   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 January well, nephroureteral cath removed in hosp. stone analysis- 100% Uric acid.  Pt taking Tamsulosin 0.4 mg, Potassium citrate 5 MEQ x 2 BID, and baking soda 1/4 teaspoon BID.      [Dysuria] : no dysuria [Hematuria - Gross] : no gross hematuria

## 2024-04-24 ENCOUNTER — EMERGENCY (EMERGENCY)
Facility: HOSPITAL | Age: 64
LOS: 1 days | Discharge: HOME | End: 2024-04-24
Attending: EMERGENCY MEDICINE | Admitting: EMERGENCY MEDICINE
Payer: COMMERCIAL

## 2024-04-24 VITALS
TEMPERATURE: 98 F | HEIGHT: 69 IN | HEART RATE: 92 BPM | OXYGEN SATURATION: 98 % | DIASTOLIC BLOOD PRESSURE: 62 MMHG | SYSTOLIC BLOOD PRESSURE: 127 MMHG | RESPIRATION RATE: 18 BRPM | WEIGHT: 164.91 LBS

## 2024-04-24 VITALS
SYSTOLIC BLOOD PRESSURE: 126 MMHG | TEMPERATURE: 98 F | RESPIRATION RATE: 16 BRPM | OXYGEN SATURATION: 95 % | HEART RATE: 68 BPM | DIASTOLIC BLOOD PRESSURE: 72 MMHG

## 2024-04-24 DIAGNOSIS — Z98.890 OTHER SPECIFIED POSTPROCEDURAL STATES: Chronic | ICD-10-CM

## 2024-04-24 LAB
ALBUMIN SERPL ELPH-MCNC: 3.5 G/DL — SIGNIFICANT CHANGE UP (ref 3.3–5)
ALP SERPL-CCNC: 57 U/L — SIGNIFICANT CHANGE UP (ref 40–120)
ALT FLD-CCNC: 29 U/L — SIGNIFICANT CHANGE UP (ref 12–78)
ANION GAP SERPL CALC-SCNC: 4 MMOL/L — LOW (ref 5–17)
APTT BLD: 36.6 SEC — HIGH (ref 24.5–35.6)
AST SERPL-CCNC: 27 U/L — SIGNIFICANT CHANGE UP (ref 15–37)
BASOPHILS # BLD AUTO: 0.04 K/UL — SIGNIFICANT CHANGE UP (ref 0–0.2)
BASOPHILS NFR BLD AUTO: 0.5 % — SIGNIFICANT CHANGE UP (ref 0–2)
BILIRUB SERPL-MCNC: 0.4 MG/DL — SIGNIFICANT CHANGE UP (ref 0.2–1.2)
BUN SERPL-MCNC: 18 MG/DL — SIGNIFICANT CHANGE UP (ref 7–23)
CALCIUM SERPL-MCNC: 8.8 MG/DL — SIGNIFICANT CHANGE UP (ref 8.5–10.1)
CHLORIDE SERPL-SCNC: 111 MMOL/L — HIGH (ref 96–108)
CK SERPL-CCNC: 141 U/L — SIGNIFICANT CHANGE UP (ref 26–308)
CO2 SERPL-SCNC: 28 MMOL/L — SIGNIFICANT CHANGE UP (ref 22–31)
CREAT SERPL-MCNC: 1.1 MG/DL — SIGNIFICANT CHANGE UP (ref 0.5–1.3)
D DIMER BLD IA.RAPID-MCNC: 312 NG/ML DDU — HIGH
EGFR: 75 ML/MIN/1.73M2 — SIGNIFICANT CHANGE UP
EOSINOPHIL # BLD AUTO: 0.25 K/UL — SIGNIFICANT CHANGE UP (ref 0–0.5)
EOSINOPHIL NFR BLD AUTO: 3.4 % — SIGNIFICANT CHANGE UP (ref 0–6)
GLUCOSE SERPL-MCNC: 151 MG/DL — HIGH (ref 70–99)
HCT VFR BLD CALC: 41.4 % — SIGNIFICANT CHANGE UP (ref 39–50)
HGB BLD-MCNC: 14.4 G/DL — SIGNIFICANT CHANGE UP (ref 13–17)
IMM GRANULOCYTES NFR BLD AUTO: 0.4 % — SIGNIFICANT CHANGE UP (ref 0–0.9)
INR BLD: 0.98 RATIO — SIGNIFICANT CHANGE UP (ref 0.85–1.18)
LYMPHOCYTES # BLD AUTO: 2.29 K/UL — SIGNIFICANT CHANGE UP (ref 1–3.3)
LYMPHOCYTES # BLD AUTO: 31 % — SIGNIFICANT CHANGE UP (ref 13–44)
MCHC RBC-ENTMCNC: 30.5 PG — SIGNIFICANT CHANGE UP (ref 27–34)
MCHC RBC-ENTMCNC: 34.8 GM/DL — SIGNIFICANT CHANGE UP (ref 32–36)
MCV RBC AUTO: 87.7 FL — SIGNIFICANT CHANGE UP (ref 80–100)
MONOCYTES # BLD AUTO: 0.72 K/UL — SIGNIFICANT CHANGE UP (ref 0–0.9)
MONOCYTES NFR BLD AUTO: 9.8 % — SIGNIFICANT CHANGE UP (ref 2–14)
NEUTROPHILS # BLD AUTO: 4.05 K/UL — SIGNIFICANT CHANGE UP (ref 1.8–7.4)
NEUTROPHILS NFR BLD AUTO: 54.9 % — SIGNIFICANT CHANGE UP (ref 43–77)
NRBC # BLD: 0 /100 WBCS — SIGNIFICANT CHANGE UP (ref 0–0)
PLATELET # BLD AUTO: 207 K/UL — SIGNIFICANT CHANGE UP (ref 150–400)
POTASSIUM SERPL-MCNC: 3.9 MMOL/L — SIGNIFICANT CHANGE UP (ref 3.5–5.3)
POTASSIUM SERPL-SCNC: 3.9 MMOL/L — SIGNIFICANT CHANGE UP (ref 3.5–5.3)
PROT SERPL-MCNC: 7.2 G/DL — SIGNIFICANT CHANGE UP (ref 6–8.3)
PROTHROM AB SERPL-ACNC: 11.5 SEC — SIGNIFICANT CHANGE UP (ref 9.5–13)
RBC # BLD: 4.72 M/UL — SIGNIFICANT CHANGE UP (ref 4.2–5.8)
RBC # FLD: 12.7 % — SIGNIFICANT CHANGE UP (ref 10.3–14.5)
SODIUM SERPL-SCNC: 143 MMOL/L — SIGNIFICANT CHANGE UP (ref 135–145)
TROPONIN I, HIGH SENSITIVITY RESULT: 3.9 NG/L — SIGNIFICANT CHANGE UP
WBC # BLD: 7.38 K/UL — SIGNIFICANT CHANGE UP (ref 3.8–10.5)
WBC # FLD AUTO: 7.38 K/UL — SIGNIFICANT CHANGE UP (ref 3.8–10.5)

## 2024-04-24 PROCEDURE — 93970 EXTREMITY STUDY: CPT

## 2024-04-24 PROCEDURE — 85025 COMPLETE CBC W/AUTO DIFF WBC: CPT

## 2024-04-24 PROCEDURE — 71275 CT ANGIOGRAPHY CHEST: CPT | Mod: MC

## 2024-04-24 PROCEDURE — 84484 ASSAY OF TROPONIN QUANT: CPT

## 2024-04-24 PROCEDURE — 82550 ASSAY OF CK (CPK): CPT

## 2024-04-24 PROCEDURE — 93970 EXTREMITY STUDY: CPT | Mod: 26

## 2024-04-24 PROCEDURE — 99284 EMERGENCY DEPT VISIT MOD MDM: CPT | Mod: 25

## 2024-04-24 PROCEDURE — 99285 EMERGENCY DEPT VISIT HI MDM: CPT

## 2024-04-24 PROCEDURE — 85379 FIBRIN DEGRADATION QUANT: CPT

## 2024-04-24 PROCEDURE — 85610 PROTHROMBIN TIME: CPT

## 2024-04-24 PROCEDURE — 80053 COMPREHEN METABOLIC PANEL: CPT

## 2024-04-24 PROCEDURE — 36415 COLL VENOUS BLD VENIPUNCTURE: CPT

## 2024-04-24 PROCEDURE — 85730 THROMBOPLASTIN TIME PARTIAL: CPT

## 2024-04-24 PROCEDURE — 71275 CT ANGIOGRAPHY CHEST: CPT | Mod: 26,MC

## 2024-04-24 RX ORDER — SODIUM CHLORIDE 9 MG/ML
1000 INJECTION INTRAMUSCULAR; INTRAVENOUS; SUBCUTANEOUS ONCE
Refills: 0 | Status: COMPLETED | OUTPATIENT
Start: 2024-04-24 | End: 2024-04-24

## 2024-04-24 RX ADMIN — SODIUM CHLORIDE 1000 MILLILITER(S): 9 INJECTION INTRAMUSCULAR; INTRAVENOUS; SUBCUTANEOUS at 21:25

## 2024-04-24 RX ADMIN — Medication 60 MILLIGRAM(S): at 23:05

## 2024-04-24 NOTE — ED PROVIDER NOTE - PROVIDER TOKENS
PROVIDER:[TOKEN:[[id number]]],PROVIDER:[TOKEN:[[id number]]],PROVIDER:[TOKEN:[[id number]]]

## 2024-04-24 NOTE — ED PROVIDER NOTE - NSFOLLOWUPINSTRUCTIONS_ED_ALL_ED_FT
1. Follow-up with your Primary Medical Doctor . Call tomorrow for prompt follow-up.  2. Follow-up with Cardiology as discussed. Call today / next business day for prompt follow-up and further workup and evaluation.  3. Return to Emergency room for any worsening or persistent pain, shortness of breath, weakness, fever, abdominal pain, dizziness, passing out, unexplained pain in arms, legs, back, any vomiting, feeling like your heart is racing,  or any other concerning symptoms.  4. See attached instruction sheets for additional information, including information regarding signs and symptoms to look out for, reasons to seek immediate care and other important instructions.  5.  Plenty of fluids

## 2024-04-24 NOTE — ED PROVIDER NOTE - ENMT, MLM
Airway patent, Nasal mucosa clear. Mouth with normal mucosa. Throat has no vesicles, no oropharyngeal exudates and uvula is midline. neck supple,. no meningeal signs.

## 2024-04-24 NOTE — ED PROVIDER NOTE - CARE PROVIDER_API CALL
Fanny Patrick  Family Medicine  [address] [place] [address]  Phone: [phone]  Fax: [phone]  Follow Up Time:     Virgilio Gould  Vascular Medicine  [address] [facility], [address] [place] [id number]  Phone: [phone]  Fax: [phone]  Follow Up Time:     Jamal Lopez  Cardiovascular Disease  [address] [place] [address]  Phone: [phone]  Fax: [phone]  Follow Up Time:

## 2024-04-24 NOTE — ED ADULT TRIAGE NOTE - CHIEF COMPLAINT QUOTE
pt states he developed lightheadedness, neck pain and dizziness this morning. pt denies N,V, facial numbness, and extremity numbness.

## 2024-04-24 NOTE — ED PROVIDER NOTE - OBJECTIVE STATEMENT
63-year-old male with a history of GERD, kidney stones, pulmonary embolism on Eliquis since last November presents with was feeling slightly lightheaded since this morning, after doing his usual exercise routine.  Patient did not have any chest pain or shortness of breath during his exercise routine.  No fever or chills.  No nausea or vomiting.  No recent dizziness or easy fatigue.  No cough/URI.  No known COVID exposure or infection.  Patient has been feeling slightly lightheaded throughout the day.  No vertigo or disequilibrium.  No numbness or tingling in the arms or legs.  Patient states that when he was diagnosed with a PE, he was having some similar dizziness.  Patient recently developed a rash this morning and his left leg, without any acute swelling.  Some itchiness.  No known triggers.  No aggravating or alleviating factors otherwise noted.  No other acute complaints.

## 2024-04-24 NOTE — ED ADULT NURSE NOTE - NSFALLRISK_ED_ALL_ED
----- Message from Simona Wu MD sent at 6/21/2018 12:37 PM CDT -----  Please notify the patient - her genetic testing came up negative for the breast and female cancer related genes.  She has no increased risk above the population risk based on the known genetic markers which is excellent news.   No

## 2024-04-24 NOTE — ED PROVIDER NOTE - PATIENT PORTAL LINK FT
You can access the FollowMyHealth Patient Portal offered by [facility] by registering at the following website: [email or web address]. By joining [facility]’s FollowMyHealth portal, you will also be able to view your health information using other applications (apps) compatible with our system.

## 2024-04-24 NOTE — ED PROVIDER NOTE - PROGRESS NOTE DETAILS
Patient doing well, no acute complaints at this time.  Patient is asymptomatic.  Discussed with patient and significant other regarding the findings on the CT and ultrasound.  The patient will follow-up with his primary care doctor and will return with any acute changes or concerns.

## 2024-04-24 NOTE — ED PROVIDER NOTE - CLINICAL SUMMARY MEDICAL DECISION MAKING FREE TEXT BOX
Episode of near syncope with some waxing waning syncope throughout the day.  No acute chest pain.  Neurologically intact.  Also with new small rash on the left leg, urticarial appearing.  Will check labs, CTA chest, ultrasound due to history of similar symptoms with DVT/PE.  Outpatient follow-up if no acute findings

## 2024-04-24 NOTE — ED PROVIDER NOTE - CARE PROVIDERS DIRECT ADDRESSES
[email or web address]

## 2024-05-31 ENCOUNTER — APPOINTMENT (OUTPATIENT)
Dept: CARDIOLOGY | Facility: CLINIC | Age: 64
End: 2024-05-31
Payer: COMMERCIAL

## 2024-05-31 VITALS
DIASTOLIC BLOOD PRESSURE: 78 MMHG | BODY MASS INDEX: 24.44 KG/M2 | WEIGHT: 165 LBS | SYSTOLIC BLOOD PRESSURE: 124 MMHG | OXYGEN SATURATION: 98 % | HEIGHT: 69 IN | HEART RATE: 78 BPM

## 2024-05-31 DIAGNOSIS — I82.402 ACUTE EMBOLISM AND THROMBOSIS OF UNSPECIFIED DEEP VEINS OF LEFT LOWER EXTREMITY: ICD-10-CM

## 2024-05-31 DIAGNOSIS — I26.99 OTHER PULMONARY EMBOLISM W/OUT ACUTE COR PULMONALE: ICD-10-CM

## 2024-05-31 PROCEDURE — 99214 OFFICE O/P EST MOD 30 MIN: CPT

## 2024-05-31 PROCEDURE — G2211 COMPLEX E/M VISIT ADD ON: CPT

## 2024-05-31 RX ORDER — APIXABAN 5 MG/1
5 TABLET, FILM COATED ORAL
Qty: 180 | Refills: 1 | Status: ACTIVE | COMMUNITY
Start: 2023-12-12 | End: 1900-01-01

## 2024-05-31 NOTE — PHYSICAL EXAM
[Normal Jugular Venous A Waves Present] : normal jugular venous A waves present [Normal Jugular Venous V Waves Present] : normal jugular venous V waves present [No Jugular Venous Delgado A Waves] : no jugular venous delgado A waves [Respiration, Rhythm And Depth] : normal respiratory rhythm and effort [Exaggerated Use Of Accessory Muscles For Inspiration] : no accessory muscle use [Auscultation Breath Sounds / Voice Sounds] : lungs were clear to auscultation bilaterally [Heart Rate And Rhythm] : heart rate and rhythm were normal [Heart Sounds] : normal S1 and S2 [Murmurs] : no murmurs present [Abdomen Soft] : soft [Abdomen Tenderness] : non-tender [Abdomen Mass (___ Cm)] : no abdominal mass palpated [Abnormal Walk] : normal gait [Gait - Sufficient For Exercise Testing] : the gait was sufficient for exercise testing [Nail Clubbing] : no clubbing of the fingernails [Cyanosis, Localized] : no localized cyanosis [Petechial Hemorrhages (___cm)] : no petechial hemorrhages [] : no ischemic changes [No Skin Ulcers] : no skin ulcer [Oriented To Time, Place, And Person] : oriented to person, place, and time [FreeTextEntry1] : L LE edema reduced. No R LE edema

## 2024-05-31 NOTE — REASON FOR VISIT
[Follow-Up - Clinic] : a clinic follow-up of [FreeTextEntry1] : 5/31  Went to ER in April for dizziness Venous duplex chronic L changes No PE on eliquis breathing ok  leg swelling is ok   2/9/2024  Denies C/P or SOB. Reports reduced LE edema. Wearing compression stockings daily. Saw Hematologist.  KATHY Antigen 51.5. Factor V Leiden / Prothrombin Gene Mutation Negative.    Medications:  Eliquis 5mg BID Omeprazole Potassium Rosuvastatin  LE venous duplex 2/4: No evidence of deep venous thrombosis in the right lower extremity. Markedly improved clot burden in the left leg with small residual from the distal femoral to popliteal to tibial peroneal trunk.  1/9/2024     CT venogram results discussed with patient. No central venous compression. Recommend follow-up with urology for kidney findings.  VESSELS: No aortic aneurysm. No evidence of venous thrombosis within the inferior vena cava, bilateral common iliac and external iliac veins and bilateral internal iliac veins. No evidence of extrinsic compression of the central venous vasculature.  KIDNEYS/URETERS: No hydronephrosis bilaterally. Bilateral nonobstructive intrarenal calculi. Numerous subcentimeter hypodensities bilaterally to small to characterize. Mild malrotation of the left kidney.  12/2023  63M, HLD and GERD  Thankgiving had L leg swelling found to have a DVT and Pulmoanry embolism  Colonoscopy 2021 - no cancer PSA - 3.88 (previously elevated) - followed by urology   no family history of VTE.   Meds:  Eliquis 5mg BID Omprazole Potassium Rosuvastatin  Left leg swelling has improved.  He is wearing compression  Spent 1 day at [place]. Heparin gtt and then transitioned to Eliquis Breathing is fine.   seen by hematologist at [place].   Oct R should tendonitis, cortizone shot drove to [place] sunday before thanksgiving - 2.5 hours - he drove.  Then started feeling a calf strain.

## 2024-06-11 ENCOUNTER — RX RENEWAL (OUTPATIENT)
Age: 64
End: 2024-06-11

## 2024-06-11 RX ORDER — POTASSIUM CITRATE 5 MEQ/1
5 MEQ TABLET, EXTENDED RELEASE ORAL
Qty: 360 | Refills: 2 | Status: ACTIVE | COMMUNITY
Start: 2023-05-30 | End: 1900-01-01

## 2024-06-17 ENCOUNTER — RX RENEWAL (OUTPATIENT)
Age: 64
End: 2024-06-17

## 2024-06-17 RX ORDER — TAMSULOSIN HYDROCHLORIDE 0.4 MG/1
0.4 CAPSULE ORAL
Qty: 90 | Refills: 2 | Status: ACTIVE | COMMUNITY
Start: 2023-07-11 | End: 1900-01-01

## 2024-07-04 NOTE — ASU PATIENT PROFILE, ADULT - TEACHING/LEARNING DEVELOPMENTAL CONSIDERATIONS
07/06/24                            Mellissa Grigsby  [address] [facility] [address]    To Whom It May Concern:    This is to certify Mellissa Grigsby was evaluated at [facility] on 7/04/2024. Patient had emergency surgery and can return to regular work on 7/22/2024.     RESTRICTIONS: None             Electronically signed by:  Sydney Esteban MD   Internal Medicine   [facility] SHORT STAY UNIT A  [address] [place] [id number]  Dept Phone: [phone]    none

## 2024-07-08 ENCOUNTER — NON-APPOINTMENT (OUTPATIENT)
Age: 64
End: 2024-07-08

## 2024-07-31 ENCOUNTER — NON-APPOINTMENT (OUTPATIENT)
Age: 64
End: 2024-07-31

## 2024-08-01 ENCOUNTER — APPOINTMENT (OUTPATIENT)
Dept: INTERNAL MEDICINE | Facility: CLINIC | Age: 64
End: 2024-08-01
Payer: COMMERCIAL

## 2024-08-01 ENCOUNTER — NON-APPOINTMENT (OUTPATIENT)
Age: 64
End: 2024-08-01

## 2024-08-01 VITALS
WEIGHT: 166 LBS | HEART RATE: 72 BPM | OXYGEN SATURATION: 97 % | TEMPERATURE: 98.7 F | HEIGHT: 69 IN | SYSTOLIC BLOOD PRESSURE: 145 MMHG | DIASTOLIC BLOOD PRESSURE: 90 MMHG | BODY MASS INDEX: 24.59 KG/M2

## 2024-08-01 VITALS — SYSTOLIC BLOOD PRESSURE: 130 MMHG | DIASTOLIC BLOOD PRESSURE: 70 MMHG

## 2024-08-01 DIAGNOSIS — Z00.00 ENCOUNTER FOR GENERAL ADULT MEDICAL EXAMINATION W/OUT ABNORMAL FINDINGS: ICD-10-CM

## 2024-08-01 DIAGNOSIS — Z12.5 ENCOUNTER FOR SCREENING FOR MALIGNANT NEOPLASM OF PROSTATE: ICD-10-CM

## 2024-08-01 DIAGNOSIS — R35.1 NOCTURIA: ICD-10-CM

## 2024-08-01 DIAGNOSIS — M50.90 CERVICAL DISC DISORDER, UNSPECIFIED, UNSPECIFIED CERVICAL REGION: ICD-10-CM

## 2024-08-01 DIAGNOSIS — R94.39 ABNORMAL RESULT OF OTHER CARDIOVASCULAR FUNCTION STUDY: ICD-10-CM

## 2024-08-01 DIAGNOSIS — E55.9 VITAMIN D DEFICIENCY, UNSPECIFIED: ICD-10-CM

## 2024-08-01 DIAGNOSIS — I82.402 ACUTE EMBOLISM AND THROMBOSIS OF UNSPECIFIED DEEP VEINS OF LEFT LOWER EXTREMITY: ICD-10-CM

## 2024-08-01 DIAGNOSIS — I26.99 OTHER PULMONARY EMBOLISM W/OUT ACUTE COR PULMONALE: ICD-10-CM

## 2024-08-01 DIAGNOSIS — R73.01 IMPAIRED FASTING GLUCOSE: ICD-10-CM

## 2024-08-01 DIAGNOSIS — E78.5 HYPERLIPIDEMIA, UNSPECIFIED: ICD-10-CM

## 2024-08-01 DIAGNOSIS — K21.9 GASTRO-ESOPHAGEAL REFLUX DISEASE W/OUT ESOPHAGITIS: ICD-10-CM

## 2024-08-01 DIAGNOSIS — Z79.01 LONG TERM (CURRENT) USE OF ANTICOAGULANTS: ICD-10-CM

## 2024-08-01 DIAGNOSIS — M75.21 BICIPITAL TENDINITIS, RIGHT SHOULDER: ICD-10-CM

## 2024-08-01 DIAGNOSIS — N50.89 OTHER SPECIFIED DISORDERS OF THE MALE GENITAL ORGANS: ICD-10-CM

## 2024-08-01 DIAGNOSIS — Z87.442 PERSONAL HISTORY OF URINARY CALCULI: ICD-10-CM

## 2024-08-01 DIAGNOSIS — R97.20 ELEVATED PROSTATE, SPECIFIC ANTIGEN [PSA]: ICD-10-CM

## 2024-08-01 PROCEDURE — 93000 ELECTROCARDIOGRAM COMPLETE: CPT

## 2024-08-01 PROCEDURE — 99396 PREV VISIT EST AGE 40-64: CPT | Mod: 25

## 2024-08-01 PROCEDURE — 36415 COLL VENOUS BLD VENIPUNCTURE: CPT

## 2024-08-01 NOTE — HISTORY OF PRESENT ILLNESS
[de-identified] : 63 M w/ PMHx of elevated PSA/nocturia, hyperlipidemia, LBP, kidney stones, vitamin D def, DVT/on AC and GERD here for a CPE. Sees urologist Dr. Hoenig-- Still taking Flomax. Taking Vitamin D supplements. GERD controlled on omeprazole. Due for repeat endoscopy next summer.  Pt reports he went to PT for shoulder and neck pain, but states he still has pain at night due to sleeping position. He received a Cortisone shot in each arm on 2 different occasions and is not sure he is ready for another one.  Pt reports his lower back is fine and he is stretching every morning. Had COVID in 07/2024 that is now completely resolved.  Pt c/o a lump on his scrotum that he noticed 1 week ago. Not painful.    In the last year had a DVT with PEs and now on eliquis. Seeing vascular Dr Gould. Had hypercoaguable workup that was negative. Not having any bleeding issues. PEs resolved, DVT is still resolving.  The patient denies any current fevers, chills, cold symptoms, fatigue, headaches, dizziness, blurry vision, shortness of breath, dyspnea on exertion, cough, abdominal pain, urinary symptoms, or any nausea/vomiting/diarrhea/constipation. UTD with Ophthalmologist, and vascular and urology. Next Colonoscopy in 2025.

## 2024-08-01 NOTE — PHYSICAL EXAM
[No Acute Distress] : no acute distress [Well Nourished] : well nourished [Well Developed] : well developed [Well-Appearing] : well-appearing [Normal Sclera/Conjunctiva] : normal sclera/conjunctiva [PERRL] : pupils equal round and reactive to light [EOMI] : extraocular movements intact [Normal Outer Ear/Nose] : the outer ears and nose were normal in appearance [Normal Oropharynx] : the oropharynx was normal [No JVD] : no jugular venous distention [No Lymphadenopathy] : no lymphadenopathy [Supple] : supple [No Respiratory Distress] : no respiratory distress  [No Accessory Muscle Use] : no accessory muscle use [Clear to Auscultation] : lungs were clear to auscultation bilaterally [Normal Rate] : normal rate  [Regular Rhythm] : with a regular rhythm [Normal S1, S2] : normal S1 and S2 [No Murmur] : no murmur heard [No Carotid Bruits] : no carotid bruits [Pedal Pulses Present] : the pedal pulses are present [No Edema] : there was no peripheral edema [Soft] : abdomen soft [Non Tender] : non-tender [Non-distended] : non-distended [Normal Bowel Sounds] : normal bowel sounds [Normal Posterior Cervical Nodes] : no posterior cervical lymphadenopathy [Normal Anterior Cervical Nodes] : no anterior cervical lymphadenopathy [No CVA Tenderness] : no CVA  tenderness [No Spinal Tenderness] : no spinal tenderness [No Joint Swelling] : no joint swelling [Grossly Normal Strength/Tone] : grossly normal strength/tone [No Rash] : no rash [Coordination Grossly Intact] : coordination grossly intact [No Focal Deficits] : no focal deficits [Normal Gait] : normal gait [Deep Tendon Reflexes (DTR)] : deep tendon reflexes were 2+ and symmetric [Normal Affect] : the affect was normal [Normal Insight/Judgement] : insight and judgment were intact [Normal TMs] : both tympanic membranes were normal [Normal Nasal Mucosa] : the nasal mucosa was normal [Declined Rectal Exam] : declined rectal exam [Normal Supraclavicular Nodes] : no supraclavicular lymphadenopathy [Normal Axillary Nodes] : no axillary lymphadenopathy [Normal Inguinal Nodes] : no inguinal lymphadenopathy [Speech Grossly Normal] : speech grossly normal [Memory Grossly Normal] : memory grossly normal [Normal Mood] : the mood was normal [FreeTextEntry1] : Done with URology [de-identified] : Scrotal lump noted--size of pea

## 2024-08-01 NOTE — ASSESSMENT
[FreeTextEntry1] : 63 M w/ PMHx of elevated PSA/nocturia, hyperlipidemia, LBP, kidney stones, vitamin D def, DVT/on AC and GERD here for a CPE. #1 Hx of lower back pain/neck pain: Stretching every morning and doing well. Renewed Skelaxin to take as needed.  #2 Hyperlipidemia: Continue lifestyle modification with diet and exercise. Taking crestor. Refill for Rosuvastatin given today. Check lipids and LFTs and EKG.  #3 GERD: GERD diet and precautions. Taking omeprazole which was renewed. UTD with GI and testing. #4 Vitamin D def: Taking OTC supplements. Check level. #5 seasonal allergies: Controlled without meds. #6 Fair and dry skin. Sees derm. #7 Hx of kidney stones/elevated PSA with nocturia: Prostate bx negative. Sees Dr. Hoenig. S/p recent surgery. Now on flomax and K supplements. Check PSA and Ucx. UTD with [facility]. #8 Lump on Scrotum: Will f/u with Urologist for scrotal U/S. This is not in testicle but the skin of scrotum.  #9 DVT/PE on AC: Seeing Dr Gould. Pt understands that while he/she is on this medication that they are at an increased risk for bleeding.  However, in the patient's case the benefits outweigh the risks of anticoagulation. They are to be vigilent for any signs of bleeding including but not limited to epistaxis, hematuria, rectal bleeding, hemorrhage and skin ecchymoses etc... Pt told to be very careful when ambulating as falls can lead to significant injury including but not limited to hematomas, intracranial hemorrhage (ie. SAH, epidural and subdural hematomas) and other uncontrollable bleeding.Pt told to avoid other blood thinners while on this medication including motrin and nsaids as they will increase the risk of bleeding. Pt told to be cautious with OTC medicines including garlic, gingko, green tea and fish oils as they also have some blood thinning properties. If patient to having bleeding they are to call the office or if severe go to ER immediately. Says Pulmonary embolisms resolved on repeat scanning but DVT is not completely gone yet. #10 HCM/CPE: CPE done today including fasting BW, urinalysis and EKG. Age-appropriate health care maintenance modalities were discussed at length including proper nutrition, routine exercise, maintain healthy weight, safe sexual practices, the use of seat belts, the use of sunblock/ proper clothing, the avoidance of binge drinking, the avoidance of drug use, fall precautions, medication compliance and side effects, and the need for routine medical follow-up. All of his questions and concerns were answered at the time of the visit. He is in agreement with the above tx plan. F/u every 4-6 months and PRN. Consider shingrix shots.

## 2024-08-01 NOTE — REVIEW OF SYSTEMS
[Fever] : no fever [Chills] : no chills [Feeling Poorly] : not feeling poorly [Feeling Tired] : not feeling tired [Recent Weight Gain (___ Lbs)] : recent [unfilled] ~Ulb weight gain [Eye Pain] : no eye pain [Red Eyes] : eyes not red [Eyesight Problems] : no eyesight problems [Discharge From Eyes] : no purulent discharge from the eyes [Dry Eyes] : no dryness of the eyes [Eyes Itch] : no itching of the eyes [Earache] : no earache [Loss Of Hearing] : no hearing loss [Nosebleeds] : no nosebleeds [Nasal Discharge] : no nasal discharge [Sore Throat] : no sore throat [Hoarseness] : no hoarseness [Chest Pain] : no chest pain [Palpitations] : no palpitations [Lower Ext Edema] : no extremity edema [Shortness Of Breath] : no shortness of breath [Cough] : no cough [Wheezing] : no wheezing [SOB on Exertion] : no shortness of breath during exertion [Abdominal Pain] : no abdominal pain [Vomiting] : no vomiting [Constipation] : no constipation [Diarrhea] : no diarrhea [Heartburn] : heartburn [Melena] : no melena [Dysuria] : no dysuria [Incontinence] : no incontinence [Hesitancy] : no urinary hesitancy [Nocturia] : nocturia [Arthralgias] : no arthralgias [Joint Pain] : no joint pain [Joint Swelling] : no joint swelling [Joint Stiffness] : no joint stiffness [Limb Pain] : no limb pain [Limb Swelling] : no limb swelling [see HPI] : see HPI [Skin Lesions] : no skin lesions [Skin Wound] : no skin wound [Itching] : no itching [Change In A Mole] : no change in a mole [Dry Skin] : no dry skin [An Unusual Growth] : no unusual growth on the skin [Confused] : no confusion [Convulsions] : no convulsions [Dizziness] : no dizziness [Fainting] : no fainting [Limb Weakness] : no limb weakness [Difficulty Walking] : no difficulty walking [Suicidal] : not suicidal [Sleep Disturbances] : no sleep disturbances [Anxiety] : no anxiety [Depression] : no depression [Change In Personality] : no personality change [Emotional Problems] : no emotional problems [Proptosis] : no proptosis [Hot Flashes] : no hot flashes [Muscle Weakness] : no muscle weakness [Erectile Dysfunction] : no erectile dysfunction [Deepening Of The Voice] : no deepening of the voice [Feelings Of Weakness] : no feelings of weakness [Easy Bleeding] : no tendency for easy bleeding [Easy Bruising] : no tendency for easy bruising [Swollen Glands] : no swollen glands [Swollen Glands In The Neck] : no swollen glands in the neck [Negative] : Heme/Lymph [FreeTextEntry1] : scrotum lump

## 2024-08-01 NOTE — HEALTH RISK ASSESSMENT
[No falls in past year] : Patient reported no falls in the past year [0] : 2) Feeling down, depressed, or hopeless: Not at all (0) [PHQ-2 Negative - No further assessment needed] : PHQ-2 Negative - No further assessment needed [[id number]] : 0 [Never] : Never [NO] : No [Patient reported colonoscopy was normal] : Patient reported colonoscopy was normal [HIV test declined] : HIV test declined [Hepatitis C test declined] : Hepatitis C test declined [Fully functional (bathing, dressing, toileting, transferring, walking, feeding)] : Fully functional (bathing, dressing, toileting, transferring, walking, feeding) [Fully functional (using the telephone, shopping, preparing meals, housekeeping, doing laundry, using] : Fully functional and needs no help or supervision to perform IADLs (using the telephone, shopping, preparing meals, housekeeping, doing laundry, using transportation, managing medications and managing finances) [ColonoscopyDate] : 8/2020 [ColonoscopyComments] : Repeat in 5 years.

## 2024-08-01 NOTE — END OF VISIT
[FreeTextEntry4] : This note was written by Sanam Valladares on 08/01/2024 actively solely Fanny Rosales M.D. I, Sanam Valladares, am scribing for and in the presence of Fanny Rosales M.D. in the following sections HISTORY OF PRESENT ILLNESS, PAST MEDICAL/FAMILY/SOCIAL HISTORY; REVIEW OF SYSTEMS; VITAL SIGNS; PHYSICAL EXAM; ASSESSMENT/PLAN.   All medical record entries made by this scribe at , Fanny Rosales M.D. direction and personally dictated by me on 08/01/2024. I personally performed the services described in the documentation, reviewed the documentation recorded by the scribe in my presence, and it accurately and completely records my words and actions.

## 2024-08-02 ENCOUNTER — NON-APPOINTMENT (OUTPATIENT)
Age: 64
End: 2024-08-02

## 2024-08-04 LAB
25(OH)D3 SERPL-MCNC: 34.4 NG/ML
ALBUMIN SERPL ELPH-MCNC: 4.6 G/DL
ALP BLD-CCNC: 58 U/L
ALT SERPL-CCNC: 25 U/L
ANION GAP SERPL CALC-SCNC: 13 MMOL/L
AST SERPL-CCNC: 23 U/L
BASOPHILS # BLD AUTO: 0.04 K/UL
BASOPHILS NFR BLD AUTO: 0.8 %
BILIRUB SERPL-MCNC: 0.8 MG/DL
BUN SERPL-MCNC: 21 MG/DL
CALCIUM SERPL-MCNC: 9.6 MG/DL
CHLORIDE SERPL-SCNC: 104 MMOL/L
CHOLEST SERPL-MCNC: 177 MG/DL
CO2 SERPL-SCNC: 24 MMOL/L
CREAT SERPL-MCNC: 1.1 MG/DL
EGFR: 75 ML/MIN/1.73M2
EOSINOPHIL # BLD AUTO: 0.2 K/UL
EOSINOPHIL NFR BLD AUTO: 4.1 %
ESTIMATED AVERAGE GLUCOSE: 108 MG/DL
GLUCOSE SERPL-MCNC: 105 MG/DL
HBA1C MFR BLD HPLC: 5.4 %
HCT VFR BLD CALC: 42.6 %
HDLC SERPL-MCNC: 59 MG/DL
HGB BLD-MCNC: 14.2 G/DL
IMM GRANULOCYTES NFR BLD AUTO: 1.2 %
LDLC SERPL CALC-MCNC: 98 MG/DL
LYMPHOCYTES # BLD AUTO: 1.39 K/UL
LYMPHOCYTES NFR BLD AUTO: 28.2 %
MAN DIFF?: NORMAL
MCHC RBC-ENTMCNC: 30.6 PG
MCHC RBC-ENTMCNC: 33.3 GM/DL
MCV RBC AUTO: 91.8 FL
MONOCYTES # BLD AUTO: 0.69 K/UL
MONOCYTES NFR BLD AUTO: 14 %
NEUTROPHILS # BLD AUTO: 2.55 K/UL
NEUTROPHILS NFR BLD AUTO: 51.7 %
NONHDLC SERPL-MCNC: 118 MG/DL
PLATELET # BLD AUTO: 216 K/UL
POTASSIUM SERPL-SCNC: 4.4 MMOL/L
PROT SERPL-MCNC: 6.9 G/DL
PSA SERPL-MCNC: 4.63 NG/ML
RBC # BLD: 4.64 M/UL
RBC # FLD: 13.3 %
SODIUM SERPL-SCNC: 141 MMOL/L
TRIGL SERPL-MCNC: 111 MG/DL
TSH SERPL-ACNC: 1.63 UIU/ML
URATE SERPL-MCNC: 7.8 MG/DL
WBC # FLD AUTO: 4.93 K/UL

## 2024-08-16 ENCOUNTER — APPOINTMENT (OUTPATIENT)
Dept: UROLOGY | Facility: CLINIC | Age: 64
End: 2024-08-16
Payer: COMMERCIAL

## 2024-08-16 DIAGNOSIS — R82.992 HYPEROXALURIA: ICD-10-CM

## 2024-08-16 DIAGNOSIS — R97.20 ELEVATED PROSTATE, SPECIFIC ANTIGEN [PSA]: ICD-10-CM

## 2024-08-16 DIAGNOSIS — N20.0 CALCULUS OF KIDNEY: ICD-10-CM

## 2024-08-16 DIAGNOSIS — Z12.5 ENCOUNTER FOR SCREENING FOR MALIGNANT NEOPLASM OF PROSTATE: ICD-10-CM

## 2024-08-16 DIAGNOSIS — Z87.442 PERSONAL HISTORY OF URINARY CALCULI: ICD-10-CM

## 2024-08-16 PROCEDURE — 99213 OFFICE O/P EST LOW 20 MIN: CPT | Mod: 25

## 2024-08-16 PROCEDURE — 76770 US EXAM ABDO BACK WALL COMP: CPT | Mod: 26

## 2024-08-16 NOTE — PHYSICAL EXAM
[General Appearance - Well Developed] : well developed [] : no respiratory distress [Normal Station and Gait] : the gait and station were normal for the patient's age [Skin Color & Pigmentation] : normal skin color and pigmentation [Oriented To Time, Place, And Person] : oriented to person, place, and time [Abdomen Soft] : soft [Urinary Bladder Findings] : the bladder was normal on palpation

## 2024-08-16 NOTE — ASSESSMENT
[FreeTextEntry1] : Renal US reviewed: Bilateral parenchymal echogenic foci, no shadowing but twinkling. One in lower left appears at border of sub-cm renal cyst. Bilateral simple cysts. No hydro, no solid renal mass. Compared with CT scan (limited view because only with IV contrast) appears stable; possibly up to 4-5 mm stones/calcifications.  compared to CT 12/2023- limited, as only with IV contrast venous phase, likely small stones, possibly up to 4 mm, so renal US continues to appear stable  continue Tamsulosin 0.4 mg, Potassium citrate 5 MEQ x 2 BID, and baking soda 1/4 teaspoon BID.   24 hour urine collection 8/5 canceled by lab. PSA higher than 2023, but in line and c/w labs going back to 2020. neg bx by dr. lares. D/w pt- could pursue MRI, but overall, psa remains stable  plan  1) RTC 6 months with Renal US 2) overall stable- could consider repeat or leaving alone with respect to 24 hour urine  overall doing well at this time

## 2024-08-16 NOTE — HISTORY OF PRESENT ILLNESS
[None] : None [FreeTextEntry1] : 63 yr old male presents to follow up with kidney stones. Pt doing well. Denies dysuria or hematuria.   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 Pt now taking Eliquis 5 mg BID.   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 January well, nephroureteral cath removed in hosp. stone analysis- 100% Uric acid.  Pt taking Tamsulosin 0.4 mg Potassium citrate 5 MEQ x 2 BID, and baking soda 1/4 teaspoon BID.   PSA hx 4.63- 8/2024 3.88- 7/2023 4.83- 1/2023 4.89- 2/2022 4.82- 2/2021 3.85- 1/2020---> had prostate bx with Dr. Bonilla 9/2020- all negative   [Dysuria] : no dysuria [Hematuria - Gross] : no gross hematuria

## 2024-11-05 ENCOUNTER — OUTPATIENT (OUTPATIENT)
Dept: OUTPATIENT SERVICES | Facility: HOSPITAL | Age: 64
LOS: 1 days | End: 2024-11-05
Payer: COMMERCIAL

## 2024-11-05 ENCOUNTER — APPOINTMENT (OUTPATIENT)
Dept: ULTRASOUND IMAGING | Facility: CLINIC | Age: 64
End: 2024-11-05
Payer: COMMERCIAL

## 2024-11-05 DIAGNOSIS — I82.402 ACUTE EMBOLISM AND THROMBOSIS OF UNSPECIFIED DEEP VEINS OF LEFT LOWER EXTREMITY: ICD-10-CM

## 2024-11-05 DIAGNOSIS — Z98.890 OTHER SPECIFIED POSTPROCEDURAL STATES: Chronic | ICD-10-CM

## 2024-11-05 PROCEDURE — 93970 EXTREMITY STUDY: CPT | Mod: 26

## 2024-11-05 PROCEDURE — 93970 EXTREMITY STUDY: CPT

## 2024-11-08 ENCOUNTER — APPOINTMENT (OUTPATIENT)
Dept: CARDIOLOGY | Facility: CLINIC | Age: 64
End: 2024-11-08
Payer: COMMERCIAL

## 2024-11-08 VITALS
OXYGEN SATURATION: 99 % | DIASTOLIC BLOOD PRESSURE: 77 MMHG | HEART RATE: 77 BPM | HEIGHT: 69 IN | WEIGHT: 168 LBS | BODY MASS INDEX: 24.88 KG/M2 | SYSTOLIC BLOOD PRESSURE: 125 MMHG

## 2024-11-08 DIAGNOSIS — R82.992 HYPEROXALURIA: ICD-10-CM

## 2024-11-08 DIAGNOSIS — I82.402 ACUTE EMBOLISM AND THROMBOSIS OF UNSPECIFIED DEEP VEINS OF LEFT LOWER EXTREMITY: ICD-10-CM

## 2024-11-08 DIAGNOSIS — M50.90 CERVICAL DISC DISORDER, UNSPECIFIED, UNSPECIFIED CERVICAL REGION: ICD-10-CM

## 2024-11-08 DIAGNOSIS — I26.99 OTHER PULMONARY EMBOLISM W/OUT ACUTE COR PULMONALE: ICD-10-CM

## 2024-11-08 DIAGNOSIS — Z00.00 ENCOUNTER FOR GENERAL ADULT MEDICAL EXAMINATION W/OUT ABNORMAL FINDINGS: ICD-10-CM

## 2024-11-08 LAB
25(OH)D3 SERPL-MCNC: 29.2 NG/ML
ALBUMIN SERPL ELPH-MCNC: 4.3 G/DL
ALP BLD-CCNC: 57 U/L
ALT SERPL-CCNC: 19 U/L
ANION GAP SERPL CALC-SCNC: 12 MMOL/L
AST SERPL-CCNC: 23 U/L
BILIRUB SERPL-MCNC: 0.6 MG/DL
BUN SERPL-MCNC: 19 MG/DL
CALCIUM SERPL-MCNC: 9.7 MG/DL
CHLORIDE SERPL-SCNC: 103 MMOL/L
CHOLEST SERPL-MCNC: 159 MG/DL
CO2 SERPL-SCNC: 27 MMOL/L
CREAT SERPL-MCNC: 1.19 MG/DL
DEPRECATED D DIMER PPP IA-ACNC: 253 NG/ML DDU
EGFR: 68 ML/MIN/1.73M2
GLUCOSE SERPL-MCNC: 77 MG/DL
HDLC SERPL-MCNC: 54 MG/DL
LDLC SERPL CALC-MCNC: 82 MG/DL
NONHDLC SERPL-MCNC: 106 MG/DL
POTASSIUM SERPL-SCNC: 4.4 MMOL/L
PROT SERPL-MCNC: 7.1 G/DL
SODIUM SERPL-SCNC: 142 MMOL/L
TRIGL SERPL-MCNC: 138 MG/DL

## 2024-11-08 PROCEDURE — 99214 OFFICE O/P EST MOD 30 MIN: CPT

## 2024-11-08 PROCEDURE — G2211 COMPLEX E/M VISIT ADD ON: CPT

## 2024-11-11 LAB
ESTIMATED AVERAGE GLUCOSE: 105 MG/DL
HBA1C MFR BLD HPLC: 5.3 %
NT-PROBNP SERPL-MCNC: 41 PG/ML

## 2024-11-18 ENCOUNTER — RX RENEWAL (OUTPATIENT)
Age: 64
End: 2024-11-18

## 2025-01-28 DIAGNOSIS — Z00.00 ENCOUNTER FOR GENERAL ADULT MEDICAL EXAMINATION W/OUT ABNORMAL FINDINGS: ICD-10-CM

## 2025-01-28 PROBLEM — Z86.718 HISTORY OF DVT (DEEP VEIN THROMBOSIS): Status: ACTIVE | Noted: 2025-01-28

## 2025-02-01 ENCOUNTER — NON-APPOINTMENT (OUTPATIENT)
Age: 65
End: 2025-02-01

## 2025-02-01 ENCOUNTER — TRANSCRIPTION ENCOUNTER (OUTPATIENT)
Age: 65
End: 2025-02-01

## 2025-02-07 ENCOUNTER — APPOINTMENT (OUTPATIENT)
Dept: ELECTROPHYSIOLOGY | Facility: CLINIC | Age: 65
End: 2025-02-07

## 2025-02-07 ENCOUNTER — APPOINTMENT (OUTPATIENT)
Dept: CARDIOLOGY | Facility: CLINIC | Age: 65
End: 2025-02-07
Payer: COMMERCIAL

## 2025-02-07 ENCOUNTER — NON-APPOINTMENT (OUTPATIENT)
Age: 65
End: 2025-02-07

## 2025-02-07 VITALS — BODY MASS INDEX: 24.37 KG/M2 | WEIGHT: 165 LBS | HEART RATE: 83 BPM | OXYGEN SATURATION: 97 %

## 2025-02-07 VITALS — DIASTOLIC BLOOD PRESSURE: 81 MMHG | SYSTOLIC BLOOD PRESSURE: 126 MMHG

## 2025-02-07 DIAGNOSIS — Z86.718 PERSONAL HISTORY OF OTHER VENOUS THROMBOSIS AND EMBOLISM: ICD-10-CM

## 2025-02-07 PROCEDURE — 99214 OFFICE O/P EST MOD 30 MIN: CPT

## 2025-02-07 PROCEDURE — G2211 COMPLEX E/M VISIT ADD ON: CPT | Mod: NC

## 2025-02-21 ENCOUNTER — APPOINTMENT (OUTPATIENT)
Dept: UROLOGY | Facility: CLINIC | Age: 65
End: 2025-02-21
Payer: COMMERCIAL

## 2025-02-21 ENCOUNTER — OUTPATIENT (OUTPATIENT)
Dept: OUTPATIENT SERVICES | Facility: HOSPITAL | Age: 65
LOS: 1 days | End: 2025-02-21
Payer: COMMERCIAL

## 2025-02-21 DIAGNOSIS — N20.9 URINARY CALCULUS, UNSPECIFIED: ICD-10-CM

## 2025-02-21 DIAGNOSIS — Z98.890 OTHER SPECIFIED POSTPROCEDURAL STATES: Chronic | ICD-10-CM

## 2025-02-21 DIAGNOSIS — R97.20 ELEVATED PROSTATE, SPECIFIC ANTIGEN [PSA]: ICD-10-CM

## 2025-02-21 DIAGNOSIS — Z12.5 ENCOUNTER FOR SCREENING FOR MALIGNANT NEOPLASM OF PROSTATE: ICD-10-CM

## 2025-02-21 DIAGNOSIS — R35.0 FREQUENCY OF MICTURITION: ICD-10-CM

## 2025-02-21 DIAGNOSIS — N20.0 CALCULUS OF KIDNEY: ICD-10-CM

## 2025-02-21 PROCEDURE — 76770 US EXAM ABDO BACK WALL COMP: CPT | Mod: 26

## 2025-02-21 PROCEDURE — 76770 US EXAM ABDO BACK WALL COMP: CPT

## 2025-02-21 PROCEDURE — 99214 OFFICE O/P EST MOD 30 MIN: CPT | Mod: 25

## 2025-02-24 DIAGNOSIS — R97.20 ELEVATED PROSTATE SPECIFIC ANTIGEN [PSA]: ICD-10-CM

## 2025-02-24 DIAGNOSIS — N20.0 CALCULUS OF KIDNEY: ICD-10-CM

## 2025-02-24 DIAGNOSIS — N20.9 URINARY CALCULUS, UNSPECIFIED: ICD-10-CM

## 2025-04-17 NOTE — DISCHARGE NOTE PROVIDER - PROVIDER RX CONTACT NUMBER
Patient confirmed scheduled appointment:  Date: 5/15  Time: 3:30pm  Visit type: Return   Provider: PCP  Location: [facility] [place]  Additional notes: per check out -1) see me in person in one month     [phone]

## 2025-04-28 DIAGNOSIS — I82.402 ACUTE EMBOLISM AND THROMBOSIS OF UNSPECIFIED DEEP VEINS OF LEFT LOWER EXTREMITY: ICD-10-CM

## 2025-05-09 ENCOUNTER — APPOINTMENT (OUTPATIENT)
Dept: CARDIOLOGY | Facility: CLINIC | Age: 65
End: 2025-05-09

## 2025-05-09 VITALS
OXYGEN SATURATION: 97 % | HEART RATE: 79 BPM | BODY MASS INDEX: 24.44 KG/M2 | SYSTOLIC BLOOD PRESSURE: 131 MMHG | WEIGHT: 165 LBS | HEIGHT: 69 IN | DIASTOLIC BLOOD PRESSURE: 77 MMHG

## 2025-05-09 DIAGNOSIS — Z86.718 PERSONAL HISTORY OF OTHER VENOUS THROMBOSIS AND EMBOLISM: ICD-10-CM

## 2025-05-09 DIAGNOSIS — Z00.00 ENCOUNTER FOR GENERAL ADULT MEDICAL EXAMINATION W/OUT ABNORMAL FINDINGS: ICD-10-CM

## 2025-05-21 ENCOUNTER — RX RENEWAL (OUTPATIENT)
Age: 65
End: 2025-05-21

## 2025-08-07 ENCOUNTER — APPOINTMENT (OUTPATIENT)
Dept: INTERNAL MEDICINE | Facility: CLINIC | Age: 65
End: 2025-08-07
Payer: COMMERCIAL

## 2025-08-07 VITALS
HEIGHT: 69 IN | WEIGHT: 167.25 LBS | BODY MASS INDEX: 24.77 KG/M2 | SYSTOLIC BLOOD PRESSURE: 144 MMHG | TEMPERATURE: 98 F | DIASTOLIC BLOOD PRESSURE: 74 MMHG | OXYGEN SATURATION: 99 % | HEART RATE: 71 BPM

## 2025-08-07 VITALS — DIASTOLIC BLOOD PRESSURE: 70 MMHG | SYSTOLIC BLOOD PRESSURE: 118 MMHG

## 2025-08-07 DIAGNOSIS — E55.9 VITAMIN D DEFICIENCY, UNSPECIFIED: ICD-10-CM

## 2025-08-07 DIAGNOSIS — Z79.01 LONG TERM (CURRENT) USE OF ANTICOAGULANTS: ICD-10-CM

## 2025-08-07 DIAGNOSIS — Z87.438 PERSONAL HISTORY OF OTHER DISEASES OF MALE GENITAL ORGANS: ICD-10-CM

## 2025-08-07 DIAGNOSIS — K21.9 GASTRO-ESOPHAGEAL REFLUX DISEASE W/OUT ESOPHAGITIS: ICD-10-CM

## 2025-08-07 DIAGNOSIS — Z12.5 ENCOUNTER FOR SCREENING FOR MALIGNANT NEOPLASM OF PROSTATE: ICD-10-CM

## 2025-08-07 DIAGNOSIS — M54.50 LOW BACK PAIN, UNSPECIFIED: ICD-10-CM

## 2025-08-07 DIAGNOSIS — I82.402 ACUTE EMBOLISM AND THROMBOSIS OF UNSPECIFIED DEEP VEINS OF LEFT LOWER EXTREMITY: ICD-10-CM

## 2025-08-07 DIAGNOSIS — Z12.11 ENCOUNTER FOR SCREENING FOR MALIGNANT NEOPLASM OF COLON: ICD-10-CM

## 2025-08-07 DIAGNOSIS — E78.5 HYPERLIPIDEMIA, UNSPECIFIED: ICD-10-CM

## 2025-08-07 DIAGNOSIS — Z00.00 ENCOUNTER FOR GENERAL ADULT MEDICAL EXAMINATION W/OUT ABNORMAL FINDINGS: ICD-10-CM

## 2025-08-07 DIAGNOSIS — Z87.898 PERSONAL HISTORY OF OTHER SPECIFIED CONDITIONS: ICD-10-CM

## 2025-08-07 DIAGNOSIS — M75.21 BICIPITAL TENDINITIS, RIGHT SHOULDER: ICD-10-CM

## 2025-08-07 DIAGNOSIS — I26.99 OTHER PULMONARY EMBOLISM W/OUT ACUTE COR PULMONALE: ICD-10-CM

## 2025-08-07 DIAGNOSIS — R73.01 IMPAIRED FASTING GLUCOSE: ICD-10-CM

## 2025-08-07 PROCEDURE — 99397 PER PM REEVAL EST PAT 65+ YR: CPT

## 2025-08-07 PROCEDURE — 93000 ELECTROCARDIOGRAM COMPLETE: CPT

## 2025-08-08 ENCOUNTER — NON-APPOINTMENT (OUTPATIENT)
Age: 65
End: 2025-08-08

## 2025-08-20 ENCOUNTER — APPOINTMENT (OUTPATIENT)
Dept: UROLOGY | Facility: CLINIC | Age: 65
End: 2025-08-20
Payer: COMMERCIAL

## 2025-08-20 VITALS
TEMPERATURE: 98 F | HEART RATE: 76 BPM | OXYGEN SATURATION: 99 % | DIASTOLIC BLOOD PRESSURE: 69 MMHG | SYSTOLIC BLOOD PRESSURE: 128 MMHG | RESPIRATION RATE: 17 BRPM

## 2025-08-20 DIAGNOSIS — N20.0 CALCULUS OF KIDNEY: ICD-10-CM

## 2025-08-20 DIAGNOSIS — Q63.2 ECTOPIC KIDNEY: ICD-10-CM

## 2025-08-20 DIAGNOSIS — R82.992 HYPEROXALURIA: ICD-10-CM

## 2025-08-20 DIAGNOSIS — N20.9 URINARY CALCULUS, UNSPECIFIED: ICD-10-CM

## 2025-08-20 DIAGNOSIS — Z12.5 ENCOUNTER FOR SCREENING FOR MALIGNANT NEOPLASM OF PROSTATE: ICD-10-CM

## 2025-08-20 PROCEDURE — 99213 OFFICE O/P EST LOW 20 MIN: CPT | Mod: 25

## 2025-08-20 PROCEDURE — 76770 US EXAM ABDO BACK WALL COMP: CPT | Mod: 26

## (undated) DEVICE — SPECIMEN CONTAINER 100ML

## (undated) DEVICE — POSITIONER FOAM EGG CRATE ULNAR 2PCS (PINK)

## (undated) DEVICE — POSITIONER CUSHION INSERT PRONE VIEW LG

## (undated) DEVICE — SOL IRR BAG NS 0.9% 3000ML

## (undated) DEVICE — NDL BIOPSY TROCAR TWO-PART 18G X 20CM

## (undated) DEVICE — GLV 8 PROTEXIS (WHITE)

## (undated) DEVICE — DRAPE U 47X51" NON STERILE

## (undated) DEVICE — GLV 6.5 PROTEXIS (WHITE)

## (undated) DEVICE — TUBING SUCTION 20FT

## (undated) DEVICE — VENODYNE/SCD SLEEVE CALF LARGE

## (undated) DEVICE — ACMI SELF-SEALING SEAL UP TO 7FR

## (undated) DEVICE — NDL 20CM TROCAR

## (undated) DEVICE — GLV 7 PROTEXIS (WHITE)

## (undated) DEVICE — WARMING BLANKET UPPER ADULT

## (undated) DEVICE — ADAPTER CHECK FLO 9FR STERILE

## (undated) DEVICE — GLV 7.5 PROTEXIS (WHITE)

## (undated) DEVICE — PREP BETADINE SPONGE STICKS

## (undated) DEVICE — SOL IRR POUR NS 0.9% 500ML

## (undated) DEVICE — FOLEY HOLDER STATLOCK 2 WAY ADULT

## (undated) DEVICE — DRSG TEGADERM 6"X8"

## (undated) DEVICE — TUBING RANGER FLUID IRRIGATION SET DISP

## (undated) DEVICE — Device

## (undated) DEVICE — IRR BULB PATHFINDER + 10"

## (undated) DEVICE — DRAPE TOWEL BLUE 17" X 24"

## (undated) DEVICE — GOWN TRIMAX LG

## (undated) DEVICE — AMPLATZ RENAL DILATOR 6FR-30FR X 20CM

## (undated) DEVICE — DRAPE NEPHROSCOPY 72X118"